# Patient Record
Sex: FEMALE | Race: WHITE | NOT HISPANIC OR LATINO | Employment: OTHER | URBAN - METROPOLITAN AREA
[De-identification: names, ages, dates, MRNs, and addresses within clinical notes are randomized per-mention and may not be internally consistent; named-entity substitution may affect disease eponyms.]

---

## 2024-05-21 ENCOUNTER — OFFICE VISIT (OUTPATIENT)
Dept: CARDIOLOGY | Facility: CLINIC | Age: 74
End: 2024-05-21
Payer: MEDICARE

## 2024-05-21 VITALS
BODY MASS INDEX: 37.65 KG/M2 | OXYGEN SATURATION: 96 % | SYSTOLIC BLOOD PRESSURE: 123 MMHG | WEIGHT: 240.4 LBS | DIASTOLIC BLOOD PRESSURE: 72 MMHG | HEART RATE: 57 BPM

## 2024-05-21 DIAGNOSIS — E78.5 HYPERLIPIDEMIA, UNSPECIFIED HYPERLIPIDEMIA TYPE: ICD-10-CM

## 2024-05-21 DIAGNOSIS — I48.0 PAROXYSMAL ATRIAL FIBRILLATION (MULTI): Primary | ICD-10-CM

## 2024-05-21 DIAGNOSIS — I10 HYPERTENSION, UNSPECIFIED TYPE: ICD-10-CM

## 2024-05-21 PROCEDURE — 3078F DIAST BP <80 MM HG: CPT | Performed by: NURSE PRACTITIONER

## 2024-05-21 PROCEDURE — 1159F MED LIST DOCD IN RCRD: CPT | Performed by: NURSE PRACTITIONER

## 2024-05-21 PROCEDURE — 1036F TOBACCO NON-USER: CPT | Performed by: NURSE PRACTITIONER

## 2024-05-21 PROCEDURE — 99214 OFFICE O/P EST MOD 30 MIN: CPT | Performed by: NURSE PRACTITIONER

## 2024-05-21 PROCEDURE — 3074F SYST BP LT 130 MM HG: CPT | Performed by: NURSE PRACTITIONER

## 2024-05-21 RX ORDER — DM/PE/ACETAMINOPHEN/CHLORPHENR 10-5-325-2
1 TABLET, SEQUENTIAL ORAL DAILY
COMMUNITY
Start: 2022-07-08

## 2024-05-21 RX ORDER — ACYCLOVIR 400 MG/1
1 TABLET ORAL DAILY
COMMUNITY
Start: 2021-09-13

## 2024-05-21 RX ORDER — EZETIMIBE 10 MG/1
10 TABLET ORAL DAILY
COMMUNITY

## 2024-05-21 RX ORDER — METOPROLOL SUCCINATE 50 MG/1
50 TABLET, EXTENDED RELEASE ORAL DAILY
Qty: 30 TABLET | Refills: 11 | Status: SHIPPED | OUTPATIENT
Start: 2024-05-21 | End: 2025-05-21

## 2024-05-21 RX ORDER — ESOMEPRAZOLE MAGNESIUM 40 MG/1
40 CAPSULE, DELAYED RELEASE ORAL
COMMUNITY

## 2024-05-21 RX ORDER — EPINEPHRINE 0.22MG
1 AEROSOL WITH ADAPTER (ML) INHALATION DAILY
COMMUNITY
Start: 2020-05-28

## 2024-05-21 RX ORDER — DILTIAZEM HYDROCHLORIDE 120 MG/1
1 CAPSULE, EXTENDED RELEASE ORAL DAILY
COMMUNITY
Start: 2022-07-08 | End: 2024-05-21

## 2024-05-21 RX ORDER — FENOFIBRATE 160 MG/1
160 TABLET ORAL DAILY
COMMUNITY
Start: 2024-05-14

## 2024-05-21 ASSESSMENT — ENCOUNTER SYMPTOMS
HEMATOLOGIC/LYMPHATIC NEGATIVE: 1
RESPIRATORY NEGATIVE: 1
NEUROLOGICAL NEGATIVE: 1
MYALGIAS: 1
ENDOCRINE NEGATIVE: 1
EYES NEGATIVE: 1
PSYCHIATRIC NEGATIVE: 1
DEPRESSION: 0
GASTROINTESTINAL NEGATIVE: 1
PALPITATIONS: 1

## 2024-05-21 NOTE — PATIENT INSTRUCTIONS
CALL WITH ANY QUESTIONS   STOP THE DILTIAZEM   START METOPROLOL ER 50 MG DAILY   FOLLOW UP WITH ELECTROPHYSIOLOGY

## 2024-05-21 NOTE — PROGRESS NOTES
Referred by Dr. Ron ref. provider found for Follow-up (RENITAO result.  PCP referral.)     History Of Present Illness:    Lizzie Cadet is a very pleasant 74 year old female with a history of heart palpitations  and HLD, she is here for a follow up visit. The patient is seen in collaboration with Dr. Hollis. Mrs. Cadet complains of heart palpitations, describes it as a fluttering occurs more at night. Lasts 15 minutes to a few hours at a time. Started in February. Denies chest pain or shortness of breath.     Review of Systems   Constitutional: Positive for malaise/fatigue.   HENT: Negative.     Eyes: Negative.    Cardiovascular:  Positive for palpitations.   Respiratory: Negative.     Endocrine: Negative.    Hematologic/Lymphatic: Negative.    Skin: Negative.    Musculoskeletal:  Positive for myalgias.   Gastrointestinal: Negative.    Neurological: Negative.    Psychiatric/Behavioral: Negative.        Past Medical History:  She has a past medical history of Encounter for immunization (09/11/2020), Personal history of other drug therapy (10/04/2018), Personal history of other specified conditions (05/28/2020), Personal history of other specified conditions (10/04/2018), Personal history of pneumonia (recurrent) (01/10/2019), Polyp of colon (03/12/2021), and Right upper quadrant pain (02/08/2019).    Past Surgical History:  She has a past surgical history that includes Tonsillectomy (10/04/2018); Cholecystectomy (10/04/2018); Breast biopsy (10/04/2018); Hysterectomy (10/04/2018); Total knee arthroplasty (10/04/2018); Other surgical history (10/04/2018); Other surgical history (10/04/2018); and Other surgical history (10/04/2018).      Social History:  She reports that she has never smoked. She has never used smokeless tobacco. She reports current alcohol use. She reports that she does not use drugs.    Family History:  No family history on file.     Allergies:  Levofloxacin, Aspirin, Clarithromycin, and  Niacin    Outpatient Medications:  Current Outpatient Medications   Medication Instructions    acyclovir (Zovirax) 400 mg tablet 1 tablet, oral, Daily    calcium carb/vit D3/minerals (CALCIUM-VITAMIN D ORAL) 1 tablet, oral, 2 times daily    coenzyme Q-10 100 mg capsule 1 capsule, oral, Daily    dilTIAZem ER (Tiazac) 120 mg 24 hr capsule 1 capsule, oral, Daily    esomeprazole (NEXIUM) 40 mg, oral, Daily before breakfast    ezetimibe (ZETIA) 10 mg, oral, Daily    fenofibrate (TRIGLIDE) 160 mg, oral, Daily    glucosamine sulfate 2KCl 1,000 mg tablet 1 tablet, oral, Daily    vit C/E/Zn/coppr/lutein/zeaxan (PRESERVISION AREDS-2 ORAL) 1 tablet, oral, 2 times daily        Last Recorded Vitals:  Vitals:    05/21/24 0942   BP: 123/72   Pulse: 57   SpO2: 96%   Weight: 109 kg (240 lb 6.4 oz)       Physical Exam:  Physical Exam  Vitals reviewed.   HENT:      Head: Normocephalic.      Nose: Nose normal.   Eyes:      Pupils: Pupils are equal, round, and reactive to light.   Cardiovascular:      Rate and Rhythm: Normal rate and regular rhythm.   Pulmonary:      Effort: Pulmonary effort is normal.      Breath sounds: Normal breath sounds.   Abdominal:      General: Abdomen is flat.      Palpations: Abdomen is soft.   Musculoskeletal:         General: Normal range of motion.      Cervical back: Normal range of motion.   Skin:     General: Skin is warm and dry.   Neurological:      General: No focal deficit present.      Mental Status: He is alert and oriented to person, place, and time.   Psychiatric:         Mood and Affect: Mood normal.            Last Labs:  CBC -  Lab Results   Component Value Date    WBC 5.5 03/08/2021    HGB 14.1 03/08/2021    HCT 44.7 03/08/2021     03/08/2021     03/08/2021       CMP -  Lab Results   Component Value Date    CALCIUM 10.4 09/17/2021    PROT 6.2 (L) 03/08/2021    ALBUMIN 4.0 03/08/2021    AST 35 03/08/2021    ALT 38 03/08/2021    ALKPHOS 48 03/08/2021    BILITOT 0.4 03/08/2021        LIPID PANEL -   Lab Results   Component Value Date    CHOL 217 (H) 09/17/2021    TRIG 256 (H) 09/17/2021    HDL 47.8 09/17/2021    CHHDL 4.5 09/17/2021    LDLF 118 (H) 09/17/2021    VLDL 51 (H) 09/17/2021    NHDL 169 09/17/2021       RENAL FUNCTION PANEL -   Lab Results   Component Value Date    GLUCOSE 113 (H) 09/17/2021     09/17/2021    K 4.8 09/17/2021     09/17/2021    CO2 30 09/17/2021    ANIONGAP 11 09/17/2021    BUN 14 09/17/2021    CREATININE 0.80 09/17/2021    CALCIUM 10.4 09/17/2021    ALBUMIN 4.0 03/08/2021        Lab Results   Component Value Date    HGBA1C 6.0 (H) 04/19/2024       Last Cardiology Tests:  ECG:    Echo:  ECHO: 4/30/2021  1. The left ventricular systolic function is normal with a 60-65% estimated  ejection fraction.  2. There is mild mitral and tricuspid regurgitation.  Ejection Fractions:  LVEF 60-65%  Cath:    Stress Test:  Cardiac Stress Test: 5/11/2021  Summary:  1. No electrocardiographic evidence for ischemia at a maximal infusion.  2. Nuclear image results are reported separately.  3. The adequate level of stress was achieved.     Cardiac Imaging:   MEDICAL ECG Monitor: 3/29/2021 (14 days duration)     Predominant rhythm was sinus rhythm.  Minimum HR: 35 bpm  Maximum HR: 94 bpm  Average HR: 67 bpm  2 pauses lasting 3.69 - 4.30 seconds noted  Supraventricular ectopics noted 43,818 times including 841 couplets and 60 runs lasting 3-14 beats with rates to 157 bpm. and bpm  Ventricular ectopics noted 19,954 times including couplets, bigeminy and trigeminy.       Assessment/Plan   Very pleasant 74 year old female with a history of palpitations, dyslipidemia and GERD, she complains of increased heart palpitations in the evening. Recent heart monitor with her primary care shows episodes of atrial fibrillation. CHADS VASC score of 2, she will be started on Eliquis 5 mg twice a day. The Cardizem will be stopped and start Metoprolol ER 50 mg daily. Heart rate and blood  pressure well controlled today.      Plan:   Call with any questions   Stop Cardizem   Continue ezetimibe and fenofibrate  Follow up with electrophysiology  Start Metoprolol ER 50 mg daily   Start Eliquis 5 mg twice a day       Lakesha Marcelino, APRN-CNP

## 2024-06-03 ENCOUNTER — HOSPITAL ENCOUNTER (OUTPATIENT)
Dept: RADIOLOGY | Facility: HOSPITAL | Age: 74
Discharge: HOME | End: 2024-06-03
Payer: MEDICARE

## 2024-06-03 VITALS — HEIGHT: 67 IN | WEIGHT: 240 LBS | BODY MASS INDEX: 37.67 KG/M2

## 2024-06-03 DIAGNOSIS — Z12.31 ENCOUNTER FOR SCREENING MAMMOGRAM FOR MALIGNANT NEOPLASM OF BREAST: ICD-10-CM

## 2024-06-03 PROCEDURE — 77067 SCR MAMMO BI INCL CAD: CPT

## 2024-06-03 PROCEDURE — 77067 SCR MAMMO BI INCL CAD: CPT | Performed by: RADIOLOGY

## 2024-06-03 PROCEDURE — 77063 BREAST TOMOSYNTHESIS BI: CPT | Performed by: RADIOLOGY

## 2024-06-11 PROBLEM — I48.0 AF (PAROXYSMAL ATRIAL FIBRILLATION) (MULTI): Status: ACTIVE | Noted: 2024-06-11

## 2024-06-11 PROBLEM — D18.01 HEMANGIOMA OF SKIN AND SUBCUTANEOUS TISSUE: Status: ACTIVE | Noted: 2022-04-26

## 2024-06-11 PROBLEM — G47.33 OBSTRUCTIVE SLEEP APNEA SYNDROME: Status: ACTIVE | Noted: 2023-05-17

## 2024-06-11 PROBLEM — R07.9 CHEST PAIN: Status: ACTIVE | Noted: 2024-06-11

## 2024-06-11 PROBLEM — L21.8 OTHER SEBORRHEIC DERMATITIS: Status: ACTIVE | Noted: 2020-12-29

## 2024-06-11 PROBLEM — M51.369 DEGENERATION OF INTERVERTEBRAL DISC OF LUMBAR REGION: Status: ACTIVE | Noted: 2023-05-17

## 2024-06-11 PROBLEM — H02.88A MEIBOMIAN GLAND DYSFUNCTION (MGD) OF UPPER AND LOWER EYELID OF RIGHT EYE: Status: ACTIVE | Noted: 2024-06-11

## 2024-06-11 PROBLEM — I49.9 VENTRICULAR ARRHYTHMIA: Status: ACTIVE | Noted: 2024-06-11

## 2024-06-11 PROBLEM — R00.2 PALPITATIONS: Status: ACTIVE | Noted: 2022-07-08

## 2024-06-11 PROBLEM — N63.20 LEFT BREAST MASS: Status: ACTIVE | Noted: 2024-06-11

## 2024-06-11 PROBLEM — M81.0 OSTEOPOROSIS: Status: ACTIVE | Noted: 2024-06-11

## 2024-06-11 PROBLEM — L85.3 XEROSIS CUTIS: Status: ACTIVE | Noted: 2020-12-29

## 2024-06-11 PROBLEM — K63.5 POLYP OF COLON: Status: ACTIVE | Noted: 2023-05-17

## 2024-06-11 PROBLEM — K21.9 GERD (GASTROESOPHAGEAL REFLUX DISEASE): Status: ACTIVE | Noted: 2022-04-29

## 2024-06-11 PROBLEM — E78.2 MIXED HYPERLIPIDEMIA: Status: ACTIVE | Noted: 2022-04-29

## 2024-06-11 PROBLEM — H52.4 REGULAR ASTIGMATISM OF BOTH EYES WITH PRESBYOPIA: Status: ACTIVE | Noted: 2024-06-11

## 2024-06-11 PROBLEM — I49.1 PAC (PREMATURE ATRIAL CONTRACTION): Status: ACTIVE | Noted: 2024-06-11

## 2024-06-11 PROBLEM — M19.90 OSTEOARTHRITIS: Status: ACTIVE | Noted: 2023-05-17

## 2024-06-11 PROBLEM — M85.80 OSTEOPENIA: Status: ACTIVE | Noted: 2023-05-17

## 2024-06-11 PROBLEM — G89.29 CHRONIC BACK PAIN: Status: ACTIVE | Noted: 2023-05-17

## 2024-06-11 PROBLEM — E78.5 DYSLIPIDEMIA: Status: ACTIVE | Noted: 2024-06-11

## 2024-06-11 PROBLEM — M54.9 CHRONIC BACK PAIN: Status: ACTIVE | Noted: 2023-05-17

## 2024-06-11 PROBLEM — H52.10 MYOPIA: Status: ACTIVE | Noted: 2023-05-17

## 2024-06-11 PROBLEM — E66.9 OBESITY WITH BODY MASS INDEX 30 OR GREATER: Status: ACTIVE | Noted: 2022-04-29

## 2024-06-11 PROBLEM — R00.1 BRADYCARDIA: Status: ACTIVE | Noted: 2024-06-11

## 2024-06-11 PROBLEM — H52.223 REGULAR ASTIGMATISM OF BOTH EYES WITH PRESBYOPIA: Status: ACTIVE | Noted: 2024-06-11

## 2024-06-11 PROBLEM — M51.36 DEGENERATION OF INTERVERTEBRAL DISC OF LUMBAR REGION: Status: ACTIVE | Noted: 2023-05-17

## 2024-06-11 PROBLEM — H43.811 PVD (POSTERIOR VITREOUS DETACHMENT), RIGHT EYE: Status: ACTIVE | Noted: 2020-10-22

## 2024-06-11 ASSESSMENT — CHA2DS2 SCORE
VASCULAR DISEASE: NO
CHA2D2S VASC SCORE: 2
AGE IN YEARS: 65-74
PRIOR STROKE OR TIA OR THROMBOEMBOLISM: NO
DIABETES: NO
CHF OR LEFT VENTRICULAR DYSFUNCTION: NO
HYPERTENSION: NO
SEX: FEMALE

## 2024-06-11 NOTE — PROGRESS NOTES
"I had the pleasure seeing Lizzie Cadet     Chief Complaint   Patient presents with    Palpitations    Atrial Fibrillation     OUTPATIENT CONSULTATION: Cardiac Electrophysiology  DOS: June 12, 2024  REASON:  pAF  REFERRING: Lakesha Bobo CNP / Aníbal Hollis MD      [id number] Score: 2   (Age,sex)    Current Outpatient Medications   Medication Instructions    acyclovir (Zovirax) 400 mg tablet 1 tablet, oral, Daily    apixaban (ELIQUIS) 5 mg, oral, 2 times daily    calcium carb/vit D3/minerals (CALCIUM-VITAMIN D ORAL) 1 tablet, oral, 2 times daily    coenzyme Q-10 100 mg capsule 1 capsule, oral, Daily    esomeprazole (NEXIUM) 40 mg, oral, Daily before breakfast    ezetimibe (ZETIA) 10 mg, oral, Daily    fenofibrate (TRIGLIDE) 160 mg, oral, Daily    glucosamine sulfate 2KCl 1,000 mg tablet 1 tablet, oral, Daily    metoprolol succinate XL (TOPROL-XL) 50 mg, oral, Daily, Do not crush or chew.    vit C/E/Zn/coppr/lutein/zeaxan (PRESERVISION AREDS-2 ORAL) 1 tablet, oral, 2 times daily        Lizzie Cadet is a 74 y.o. with:     HL, SALO, Obesity, GERD, chronic back pain  Palpitations/   SVT  Paroxysmal AF - (index dx Apr 2024) noted on Monitor.  Tx: Metoprolol and Eliquis    AF symptoms include palpitations and fluttering feeling in chest.        TESTING    -ECHO/ TTE:  (4/30/21) LVEF 60-65%.  No sig valve disease.       -Monitor: ZIO 14 days (April 2024) showed AF ([place] 6%) longest run lasting 2hrs and 16 min, 4 runs SVT longest lasting 7 beats. HR range 169-39 bpm with Avg HR 69 bpm.  Rare SVEs ([place] <1%) and VEs ([place] 1.5%).     -NM Ex Stress:  (5/11/21) No evidence of ischemia on ECG tracings or imaging.            Objective   Physical Exam  /82 (BP Location: Left arm, Patient Position: Sitting, BP Cuff Size: Large adult)   Pulse 84   Resp 18   Ht 1.702 m (5' 7\")   Wt 110 kg (242 lb)   SpO2 95%   BMI 37.90 kg/m²     General Appearance:  Alert, cooperative, no distress, appears stated age "   Head:  Normocephalic, without obvious abnormality, atraumatic   Eyes:  PERRL, conjunctiva/corneas clear, EOM's intact, fundi benign, both eyes   Ears:  Normal TM's and external ear canals, both ears   Nose: Nares normal, septum midline,mucosa normal, no drainage or sinus tenderness   Throat: Lips, mucosa, and tongue normal; teeth and gums normal   Neck: Supple, symmetrical, trachea midline, no adenopathy;  thyroid: not enlarged, symmetric, no tenderness/mass/nodules; no carotid bruit or JVD   Back:   Symmetric, no curvature, ROM normal, no CVA tenderness   Lungs:   Clear to auscultation bilaterally, respirations unlabored   Breasts:  No masses or tenderness   Heart:  Regular rate and rhythm, S1 and S2 normal, no murmur, rub, or gallop   Abdomen:   Soft, non-tender, bowel sounds active all four quadrants,  no masses, no organomegaly   Pelvic: Deferred   Extremities: Extremities normal, atraumatic, no cyanosis or edema   Pulses: 2+ and symmetric   Skin: Skin color, texture, turgor normal, no rashes or lesions   Lymph nodes: Cervical, supraclavicular, and axillary nodes normal   Neurologic: Normal           Assessment/Plan   Paroxysmal Atrial Fibrillation - She is fairly symptomatic and also fatigued. She is also very active. We discussed the options. We discussed 1. Observation 2. Medical Therapy and 3. PVI. She is very much against taking medications. She is also planning on moving and would prefer to have the PVI. I did explain the procedure, I explained the risks. We are proceeding with PVI on August 16th, 2024

## 2024-06-12 ENCOUNTER — OFFICE VISIT (OUTPATIENT)
Dept: CARDIOLOGY | Facility: CLINIC | Age: 74
End: 2024-06-12
Payer: MEDICARE

## 2024-06-12 VITALS
HEIGHT: 67 IN | DIASTOLIC BLOOD PRESSURE: 82 MMHG | OXYGEN SATURATION: 95 % | WEIGHT: 242 LBS | BODY MASS INDEX: 37.98 KG/M2 | SYSTOLIC BLOOD PRESSURE: 133 MMHG | RESPIRATION RATE: 18 BRPM | HEART RATE: 84 BPM

## 2024-06-12 DIAGNOSIS — I48.0 PAROXYSMAL ATRIAL FIBRILLATION (MULTI): ICD-10-CM

## 2024-06-12 DIAGNOSIS — R00.2 PALPITATIONS: ICD-10-CM

## 2024-06-12 DIAGNOSIS — R00.1 BRADYCARDIA: ICD-10-CM

## 2024-06-12 DIAGNOSIS — R07.9 CHEST PAIN, UNSPECIFIED TYPE: ICD-10-CM

## 2024-06-12 DIAGNOSIS — Z01.818 PREOP TESTING: ICD-10-CM

## 2024-06-12 PROCEDURE — 1159F MED LIST DOCD IN RCRD: CPT | Performed by: INTERNAL MEDICINE

## 2024-06-12 PROCEDURE — 1036F TOBACCO NON-USER: CPT | Performed by: INTERNAL MEDICINE

## 2024-06-12 PROCEDURE — 93010 ELECTROCARDIOGRAM REPORT: CPT | Performed by: INTERNAL MEDICINE

## 2024-06-12 PROCEDURE — 93005 ELECTROCARDIOGRAM TRACING: CPT | Performed by: INTERNAL MEDICINE

## 2024-06-12 PROCEDURE — 99205 OFFICE O/P NEW HI 60 MIN: CPT | Performed by: INTERNAL MEDICINE

## 2024-06-12 PROCEDURE — 99215 OFFICE O/P EST HI 40 MIN: CPT | Mod: 25 | Performed by: INTERNAL MEDICINE

## 2024-06-12 ASSESSMENT — PATIENT HEALTH QUESTIONNAIRE - PHQ9
2. FEELING DOWN, DEPRESSED OR HOPELESS: NOT AT ALL
1. LITTLE INTEREST OR PLEASURE IN DOING THINGS: NOT AT ALL
SUM OF ALL RESPONSES TO PHQ9 QUESTIONS 1 AND 2: 0

## 2024-06-12 ASSESSMENT — ENCOUNTER SYMPTOMS: DEPRESSION: 0

## 2024-06-13 LAB
ATRIAL RATE: 375 BPM
Q ONSET: 229 MS
QRS COUNT: 15 BEATS
QRS DURATION: 78 MS
QT INTERVAL: 336 MS
QTC CALCULATION(BAZETT): 415 MS
QTC FREDERICIA: 387 MS
R AXIS: -37 DEGREES
T AXIS: 12 DEGREES
T OFFSET: 397 MS
VENTRICULAR RATE: 92 BPM

## 2024-06-27 ENCOUNTER — HOSPITAL ENCOUNTER (OUTPATIENT)
Dept: CARDIOLOGY | Facility: CLINIC | Age: 74
Discharge: HOME | End: 2024-06-27
Payer: MEDICARE

## 2024-06-27 DIAGNOSIS — R07.9 CHEST PAIN, UNSPECIFIED TYPE: ICD-10-CM

## 2024-06-27 DIAGNOSIS — R00.1 BRADYCARDIA: ICD-10-CM

## 2024-06-27 DIAGNOSIS — R00.2 PALPITATIONS: ICD-10-CM

## 2024-06-27 PROCEDURE — 93306 TTE W/DOPPLER COMPLETE: CPT

## 2024-06-27 PROCEDURE — 93306 TTE W/DOPPLER COMPLETE: CPT | Performed by: INTERNAL MEDICINE

## 2024-06-29 LAB
AORTIC VALVE PEAK VELOCITY: 1.3 M/S
AV PEAK GRADIENT: 6.8 MMHG
AVA (PEAK VEL): 2.26 CM2
EJECTION FRACTION APICAL 4 CHAMBER: 57.8
EJECTION FRACTION: 63 %
LEFT ATRIUM VOLUME AREA LENGTH INDEX BSA: 33.6 ML/M2
LEFT VENTRICLE INTERNAL DIMENSION DIASTOLE: 4.86 CM (ref 3.5–6)
LEFT VENTRICULAR OUTFLOW TRACT DIAMETER: 2.03 CM
RIGHT VENTRICLE FREE WALL PEAK S': 9.83 CM/S
RIGHT VENTRICLE PEAK SYSTOLIC PRESSURE: 33.2 MMHG
TRICUSPID ANNULAR PLANE SYSTOLIC EXCURSION: 1.7 CM

## 2024-07-23 ENCOUNTER — OFFICE VISIT (OUTPATIENT)
Dept: CARDIOLOGY | Facility: CLINIC | Age: 74
End: 2024-07-23
Payer: MEDICARE

## 2024-07-23 VITALS
BODY MASS INDEX: 37.98 KG/M2 | OXYGEN SATURATION: 95 % | SYSTOLIC BLOOD PRESSURE: 130 MMHG | WEIGHT: 242 LBS | HEART RATE: 90 BPM | HEIGHT: 67 IN | DIASTOLIC BLOOD PRESSURE: 81 MMHG

## 2024-07-23 DIAGNOSIS — I48.0 PAROXYSMAL ATRIAL FIBRILLATION (MULTI): ICD-10-CM

## 2024-07-23 DIAGNOSIS — E78.5 HYPERLIPIDEMIA, UNSPECIFIED HYPERLIPIDEMIA TYPE: Primary | ICD-10-CM

## 2024-07-23 PROCEDURE — G2211 COMPLEX E/M VISIT ADD ON: HCPCS | Performed by: NURSE PRACTITIONER

## 2024-07-23 PROCEDURE — 1159F MED LIST DOCD IN RCRD: CPT | Performed by: NURSE PRACTITIONER

## 2024-07-23 PROCEDURE — 99213 OFFICE O/P EST LOW 20 MIN: CPT | Performed by: NURSE PRACTITIONER

## 2024-07-23 PROCEDURE — 3008F BODY MASS INDEX DOCD: CPT | Performed by: NURSE PRACTITIONER

## 2024-07-23 PROCEDURE — 1036F TOBACCO NON-USER: CPT | Performed by: NURSE PRACTITIONER

## 2024-07-23 ASSESSMENT — ENCOUNTER SYMPTOMS
DEPRESSION: 0
ENDOCRINE NEGATIVE: 1
EYES NEGATIVE: 1
RESPIRATORY NEGATIVE: 1
PALPITATIONS: 1
PSYCHIATRIC NEGATIVE: 1
HEMATOLOGIC/LYMPHATIC NEGATIVE: 1
NEUROLOGICAL NEGATIVE: 1
GASTROINTESTINAL NEGATIVE: 1
MYALGIAS: 1

## 2024-07-23 ASSESSMENT — PATIENT HEALTH QUESTIONNAIRE - PHQ9
SUM OF ALL RESPONSES TO PHQ9 QUESTIONS 1 AND 2: 0
2. FEELING DOWN, DEPRESSED OR HOPELESS: NOT AT ALL
1. LITTLE INTEREST OR PLEASURE IN DOING THINGS: NOT AT ALL

## 2024-07-23 NOTE — PROGRESS NOTES
Referred by Dr. Ariadne reed. provider found for Follow-up (2 Month FUV)     History Of Present Illness:    Lizzie Cadet is a very pleasant 74 year old female with a history of heart palpitations  and HLD, she is here for a follow up visit. The patient is seen in collaboration with Dr. Hollis. Mrs. Cadet is scheduled for a PVI on 8/16/2024. Continues to have palpitations all the time. Difficulty relaxing. Denies chest pain,increased shortness of breath.     Review of Systems   Constitutional: Positive for malaise/fatigue.   HENT: Negative.     Eyes: Negative.    Cardiovascular:  Positive for palpitations.   Respiratory: Negative.     Endocrine: Negative.    Hematologic/Lymphatic: Negative.    Skin: Negative.    Musculoskeletal:  Positive for myalgias.   Gastrointestinal: Negative.    Neurological: Negative.    Psychiatric/Behavioral: Negative.        Past Medical History:  She has a past medical history of Encounter for immunization (09/11/2020), Personal history of other drug therapy (10/04/2018), Personal history of other specified conditions (05/28/2020), Personal history of other specified conditions (10/04/2018), Personal history of pneumonia (recurrent) (01/10/2019), Polyp of colon (03/12/2021), and Right upper quadrant pain (02/08/2019).    Past Surgical History:  She has a past surgical history that includes Tonsillectomy (10/04/2018); Cholecystectomy (10/04/2018); Breast biopsy (10/04/2018); Hysterectomy (10/04/2018); Total knee arthroplasty (10/04/2018); Other surgical history (10/04/2018); Other surgical history (10/04/2018); Other surgical history (10/04/2018); and Breast biopsy (Left).      Social History:  She reports that she has never smoked. She has never used smokeless tobacco. She reports current alcohol use. She reports that she does not use drugs.    Family History:  Family History   Problem Relation Name Age of Onset    Breast cancer Mother  60 - 69    Breast cancer Maternal Grandmother  60 - 69     Breast cancer Mother's Sister  40        Allergies:  Levofloxacin, Aspirin, Clarithromycin, and Niacin    Outpatient Medications:  Current Outpatient Medications   Medication Instructions    acyclovir (Zovirax) 400 mg tablet 1 tablet, oral, As needed    apixaban (ELIQUIS) 5 mg, oral, 2 times daily    calcium carb/vit D3/minerals (CALCIUM-VITAMIN D ORAL) 1 tablet, oral, 2 times daily    coenzyme Q-10 100 mg capsule 1 capsule, oral, Daily    esomeprazole (NEXIUM) 40 mg, oral, Daily before breakfast    ezetimibe (ZETIA) 10 mg, oral, Daily    fenofibrate (TRIGLIDE) 160 mg, oral, Daily    glucosamine sulfate 2KCl 1,000 mg tablet 1 tablet, oral, Daily    metoprolol succinate XL (TOPROL-XL) 50 mg, oral, Daily, Do not crush or chew.    vit C/E/Zn/coppr/lutein/zeaxan (PRESERVISION AREDS-2 ORAL) 1 tablet, oral, 2 times daily        Last Recorded Vitals:  There were no vitals filed for this visit.      Physical Exam:  Physical Exam  Vitals reviewed.   HENT:      Head: Normocephalic.      Nose: Nose normal.   Eyes:      Pupils: Pupils are equal, round, and reactive to light.   Cardiovascular:      Rate and Rhythm: Normal rate and regular rhythm.   Pulmonary:      Effort: Pulmonary effort is normal.      Breath sounds: Normal breath sounds.   Abdominal:      General: Abdomen is flat.      Palpations: Abdomen is soft.   Musculoskeletal:         General: Normal range of motion.      Cervical back: Normal range of motion.   Skin:     General: Skin is warm and dry.   Neurological:      General: No focal deficit present.      Mental Status: He is alert and oriented to person, place, and time.   Psychiatric:         Mood and Affect: Mood normal.            Last Labs:  CBC -  Lab Results   Component Value Date    WBC 5.5 03/08/2021    HGB 14.1 03/08/2021    HCT 44.7 03/08/2021     03/08/2021     03/08/2021       CMP -  Lab Results   Component Value Date    CALCIUM 10.4 09/17/2021    PROT 6.2 (L) 03/08/2021    ALBUMIN  4.0 03/08/2021    AST 35 03/08/2021    ALT 38 03/08/2021    ALKPHOS 48 03/08/2021    BILITOT 0.4 03/08/2021       LIPID PANEL -   Lab Results   Component Value Date    CHOL 217 (H) 09/17/2021    TRIG 256 (H) 09/17/2021    HDL 47.8 09/17/2021    CHHDL 4.5 09/17/2021    LDLF 118 (H) 09/17/2021    VLDL 51 (H) 09/17/2021    NHDL 169 09/17/2021       RENAL FUNCTION PANEL -   Lab Results   Component Value Date    GLUCOSE 113 (H) 09/17/2021     09/17/2021    K 4.8 09/17/2021     09/17/2021    CO2 30 09/17/2021    ANIONGAP 11 09/17/2021    BUN 14 09/17/2021    CREATININE 0.80 09/17/2021    CALCIUM 10.4 09/17/2021    ALBUMIN 4.0 03/08/2021        Lab Results   Component Value Date    HGBA1C 6.0 (H) 04/19/2024       Last Cardiology Tests:  ECG:    Echo:  ECHO: 4/30/2021  1. The left ventricular systolic function is normal with a 60-65% estimated  ejection fraction.  2. There is mild mitral and tricuspid regurgitation.  Ejection Fractions:  LVEF 60-65%  Cath:    Stress Test:  Cardiac Stress Test: 5/11/2021  Summary:  1. No electrocardiographic evidence for ischemia at a maximal infusion.  2. Nuclear image results are reported separately.  3. The adequate level of stress was achieved.     Cardiac Imaging:   MEDICAL ECG Monitor: 3/29/2021 (14 days duration)     Predominant rhythm was sinus rhythm.  Minimum HR: 35 bpm  Maximum HR: 94 bpm  Average HR: 67 bpm  2 pauses lasting 3.69 - 4.30 seconds noted  Supraventricular ectopics noted 43,818 times including 841 couplets and 60 runs lasting 3-14 beats with rates to 157 bpm. and bpm  Ventricular ectopics noted 19,954 times including couplets, bigeminy and trigeminy.       Assessment/Plan   Very pleasant 74 year old female with a history of palpitations, dyslipidemia and GERD, she is scheduled for PVI 8/16/2024, continues to have heart palpitations. She has been tolerating her medication. Heart rate and blood pressure are well controlled today.      Plan:   Call with any  questions   Continue ezetimibe and fenofibrate  Follow up with electrophysiology  Continue  Metoprolol ER 50 mg daily   Continue Eliquis 5 mg twice a day       Lakesha Marcelino, APRN-CNP

## 2024-07-30 ENCOUNTER — APPOINTMENT (OUTPATIENT)
Dept: DERMATOLOGY | Facility: CLINIC | Age: 74
End: 2024-07-30
Payer: MEDICARE

## 2024-07-30 DIAGNOSIS — D22.9 MULTIPLE BENIGN NEVI: Primary | ICD-10-CM

## 2024-07-30 DIAGNOSIS — L57.8 ACTINIC SKIN DAMAGE: ICD-10-CM

## 2024-07-30 DIAGNOSIS — L82.1 SEBORRHEIC KERATOSIS: ICD-10-CM

## 2024-07-30 DIAGNOSIS — L81.4 LENTIGO: ICD-10-CM

## 2024-07-30 DIAGNOSIS — Z12.83 SCREENING EXAM FOR SKIN CANCER: ICD-10-CM

## 2024-07-30 DIAGNOSIS — L21.9 SEBORRHEIC DERMATITIS: ICD-10-CM

## 2024-07-30 PROCEDURE — 1159F MED LIST DOCD IN RCRD: CPT | Performed by: DERMATOLOGY

## 2024-07-30 PROCEDURE — 1036F TOBACCO NON-USER: CPT | Performed by: DERMATOLOGY

## 2024-07-30 PROCEDURE — 99213 OFFICE O/P EST LOW 20 MIN: CPT | Performed by: DERMATOLOGY

## 2024-07-30 RX ORDER — KETOCONAZOLE 20 MG/G
CREAM TOPICAL 2 TIMES DAILY
Qty: 60 G | Refills: 11 | Status: SHIPPED | OUTPATIENT
Start: 2024-07-30

## 2024-07-30 ASSESSMENT — DERMATOLOGY PATIENT ASSESSMENT
DO YOU HAVE IRREGULAR MENSTRUAL CYCLES: NO
DO YOU USE SUNSCREEN: OCCASIONALLY
HAVE YOU HAD OR DO YOU HAVE VASCULAR DISEASE: NO
DO YOU USE A TANNING BED: NO
ARE YOU AN ORGAN TRANSPLANT RECIPIENT: NO
ARE YOU ON BIRTH CONTROL: NO
ARE YOU TRYING TO GET PREGNANT: NO
DO YOU HAVE ANY NEW OR CHANGING LESIONS: NO
HAVE YOU HAD OR DO YOU HAVE A STAPH INFECTION: NO

## 2024-07-30 ASSESSMENT — DERMATOLOGY QUALITY OF LIFE (QOL) ASSESSMENT
ARE THERE EXCLUSIONS OR EXCEPTIONS FOR THE QUALITY OF LIFE ASSESSMENT: NO
RATE HOW BOTHERED YOU ARE BY SYMPTOMS OF YOUR SKIN PROBLEM (EG, ITCHING, STINGING BURNING, HURTING OR SKIN IRRITATION): 0 - NEVER BOTHERED
DATE THE QUALITY-OF-LIFE ASSESSMENT WAS COMPLETED: 67051
RATE HOW EMOTIONALLY BOTHERED YOU ARE BY YOUR SKIN PROBLEM (FOR EXAMPLE, WORRY, EMBARRASSMENT, FRUSTRATION): 0 - NEVER BOTHERED
RATE HOW BOTHERED YOU ARE BY EFFECTS OF YOUR SKIN PROBLEMS ON YOUR ACTIVITIES (EG, GOING OUT, ACCOMPLISHING WHAT YOU WANT, WORK ACTIVITIES OR YOUR RELATIONSHIPS WITH OTHERS): 0 - NEVER BOTHERED

## 2024-07-30 ASSESSMENT — ITCH NUMERIC RATING SCALE: HOW SEVERE IS YOUR ITCHING?: 0

## 2024-07-30 ASSESSMENT — PATIENT GLOBAL ASSESSMENT (PGA): PATIENT GLOBAL ASSESSMENT: PATIENT GLOBAL ASSESSMENT:  1 - CLEAR

## 2024-07-30 NOTE — PROGRESS NOTES
Subjective     Lizzie Cadet is a 74 y.o. female who presents for the following: Skin Check.     Last derm visit 8/1/23 for Full Skin Exam - no lesions of concern    Seb derm or potentially perioral derm, Rx ketoconazole 2% cream    Review of Systems:  No other skin or systemic complaints other than what is documented elsewhere in the note.    The following portions of the chart were reviewed this encounter and updated as appropriate:         Skin Cancer History  No skin cancer on file.      Specialty Problems          Dermatology Problems    Other seborrheic dermatitis    Xerosis cutis    Hemangioma of skin and subcutaneous tissue        Objective   Well appearing patient in no apparent distress; mood and affect are within normal limits.    A full examination was performed including scalp, head, eyes, ears, nose, lips, neck, chest, axillae, abdomen, back, buttocks, bilateral upper extremities, bilateral lower extremities, hands, feet, fingers, toes, fingernails, and toenails. All findings within normal limits unless otherwise noted below.    Assessment/Plan   1. Multiple benign nevi  Brown and tan macules and papules with reassuring findings on dermoscopy    -These lesions have benign, reassuring patterns on dermoscopy  -Recommend continued self observation, and to contact the office if any changes in nevi are noticed    2. Lentigo  Tan macules    -Benign appearing on exam  -Reassurance, recommend observation    3. Seborrheic keratosis  Stuck on, waxy macule(s)/papule(s)/plaque(s) with comedo-like openings and milia like cysts    Larger plaque with pedunculated tag on left inguinal fold. Discussed removal options but she has upcoming cardiac ablation so we will defer any treatment in the inguinal region right now    -Discussed the nature of the diagnosis  -Reassurance, recommend continued observation    4. Actinic skin damage  Background of photodamage with hyper- and hypo-pigmented macules on the skin    5.  Screening exam for skin cancer  As part of a routine Full Skin Exam, a genital examination with the presence of a chaperone was offered. The patient declined the exam and declined the chaperone. Follows with gyne      Full body skin exam  -No lesions concerning for malignancy on the remainder the skin exam today   - The ugly duckling sign was discussed. Monitor for any skin lesions that are different in color, shape, or size than others on body  -Sun protection was discussed. Recommend SPF 30+, hats with brims, sun protective clothing, and avoiding sun exposure between 10 AM and 2 PM whenever possible  -Recommend regular skin exams or sooner if new or changing lesions       6. Seborrheic dermatitis  Scalp  Clear on exam today on scalp. Light pink erythema of nasolabial folds    Try OTC dandruff shampoo. If not improving can request fluocinonide solution for scalp    ketoconazole (NIZOral) 2 % cream - Scalp  Apply topically 2 times a day. To redness, flaking, irritation of face around nose, eyebrows, ears and skin folds for 2-4 weeks as needed        Follow up 1-2 years Full Skin Exam, she will follow with derm in [place] since she is moving

## 2024-08-02 ENCOUNTER — LAB (OUTPATIENT)
Dept: LAB | Facility: LAB | Age: 74
End: 2024-08-02
Payer: MEDICARE

## 2024-08-02 DIAGNOSIS — I48.0 PAROXYSMAL ATRIAL FIBRILLATION (MULTI): ICD-10-CM

## 2024-08-02 DIAGNOSIS — Z01.818 PREOP TESTING: ICD-10-CM

## 2024-08-02 LAB
ANION GAP SERPL CALC-SCNC: 14 MMOL/L (ref 10–20)
BUN SERPL-MCNC: 18 MG/DL (ref 6–23)
CALCIUM SERPL-MCNC: 11.1 MG/DL (ref 8.6–10.6)
CHLORIDE SERPL-SCNC: 104 MMOL/L (ref 98–107)
CO2 SERPL-SCNC: 28 MMOL/L (ref 21–32)
CREAT SERPL-MCNC: 0.83 MG/DL (ref 0.5–1.05)
EGFRCR SERPLBLD CKD-EPI 2021: 75 ML/MIN/1.73M*2
ERYTHROCYTE [DISTWIDTH] IN BLOOD BY AUTOMATED COUNT: 12.9 % (ref 11.5–14.5)
GLUCOSE SERPL-MCNC: 110 MG/DL (ref 74–99)
HCT VFR BLD AUTO: 46.2 % (ref 36–46)
HGB BLD-MCNC: 14.5 G/DL (ref 12–16)
MCH RBC QN AUTO: 30.5 PG (ref 26–34)
MCHC RBC AUTO-ENTMCNC: 31.4 G/DL (ref 32–36)
MCV RBC AUTO: 97 FL (ref 80–100)
NRBC BLD-RTO: 0 /100 WBCS (ref 0–0)
PLATELET # BLD AUTO: 345 X10*3/UL (ref 150–450)
POTASSIUM SERPL-SCNC: 4.8 MMOL/L (ref 3.5–5.3)
RBC # BLD AUTO: 4.76 X10*6/UL (ref 4–5.2)
SODIUM SERPL-SCNC: 141 MMOL/L (ref 136–145)
WBC # BLD AUTO: 6.1 X10*3/UL (ref 4.4–11.3)

## 2024-08-02 PROCEDURE — 80048 BASIC METABOLIC PNL TOTAL CA: CPT

## 2024-08-02 PROCEDURE — 85027 COMPLETE CBC AUTOMATED: CPT

## 2024-08-02 PROCEDURE — 36415 COLL VENOUS BLD VENIPUNCTURE: CPT

## 2024-08-16 ENCOUNTER — ANESTHESIA (OUTPATIENT)
Dept: CARDIOLOGY | Facility: HOSPITAL | Age: 74
End: 2024-08-16
Payer: MEDICARE

## 2024-08-16 ENCOUNTER — ANESTHESIA EVENT (OUTPATIENT)
Dept: CARDIOLOGY | Facility: HOSPITAL | Age: 74
End: 2024-08-16
Payer: MEDICARE

## 2024-08-16 ENCOUNTER — HOSPITAL ENCOUNTER (OUTPATIENT)
Facility: HOSPITAL | Age: 74
Setting detail: OUTPATIENT SURGERY
Discharge: HOME | End: 2024-08-16
Attending: INTERNAL MEDICINE | Admitting: INTERNAL MEDICINE
Payer: MEDICARE

## 2024-08-16 VITALS
WEIGHT: 240 LBS | TEMPERATURE: 96.8 F | HEIGHT: 67 IN | DIASTOLIC BLOOD PRESSURE: 75 MMHG | SYSTOLIC BLOOD PRESSURE: 125 MMHG | OXYGEN SATURATION: 94 % | RESPIRATION RATE: 12 BRPM | BODY MASS INDEX: 37.67 KG/M2 | HEART RATE: 65 BPM

## 2024-08-16 DIAGNOSIS — I48.0 PAROXYSMAL A-FIB (MULTI): ICD-10-CM

## 2024-08-16 LAB
ACT BLD: 175 SEC (ref 82–174)
ACT BLD: 356 SEC (ref 82–174)
ACT BLD: 368 SEC (ref 82–174)

## 2024-08-16 PROCEDURE — 93656 COMPRE EP EVAL ABLTJ ATR FIB: CPT | Performed by: INTERNAL MEDICINE

## 2024-08-16 PROCEDURE — 2500000004 HC RX 250 GENERAL PHARMACY W/ HCPCS (ALT 636 FOR OP/ED): Performed by: NURSE ANESTHETIST, CERTIFIED REGISTERED

## 2024-08-16 PROCEDURE — 2780000003 HC OR 278 NO HCPCS: Performed by: INTERNAL MEDICINE

## 2024-08-16 PROCEDURE — 2500000005 HC RX 250 GENERAL PHARMACY W/O HCPCS: Performed by: NURSE ANESTHETIST, CERTIFIED REGISTERED

## 2024-08-16 PROCEDURE — 76937 US GUIDE VASCULAR ACCESS: CPT | Performed by: INTERNAL MEDICINE

## 2024-08-16 PROCEDURE — C1732 CATH, EP, DIAG/ABL, 3D/VECT: HCPCS | Performed by: INTERNAL MEDICINE

## 2024-08-16 PROCEDURE — 85347 COAGULATION TIME ACTIVATED: CPT

## 2024-08-16 PROCEDURE — 85347 COAGULATION TIME ACTIVATED: CPT | Performed by: INTERNAL MEDICINE

## 2024-08-16 PROCEDURE — 7100000010 HC PHASE TWO TIME - EACH INCREMENTAL 1 MINUTE: Performed by: INTERNAL MEDICINE

## 2024-08-16 PROCEDURE — 2720000007 HC OR 272 NO HCPCS: Performed by: INTERNAL MEDICINE

## 2024-08-16 PROCEDURE — 3700000002 HC GENERAL ANESTHESIA TIME - EACH INCREMENTAL 1 MINUTE: Performed by: INTERNAL MEDICINE

## 2024-08-16 PROCEDURE — C1760 CLOSURE DEV, VASC: HCPCS | Performed by: INTERNAL MEDICINE

## 2024-08-16 PROCEDURE — 3700000001 HC GENERAL ANESTHESIA TIME - INITIAL BASE CHARGE: Performed by: INTERNAL MEDICINE

## 2024-08-16 PROCEDURE — C1766 INTRO/SHEATH,STRBLE,NON-PEEL: HCPCS | Performed by: INTERNAL MEDICINE

## 2024-08-16 PROCEDURE — 93662 INTRACARDIAC ECG (ICE): CPT | Performed by: INTERNAL MEDICINE

## 2024-08-16 PROCEDURE — C1759 CATH, INTRA ECHOCARDIOGRAPHY: HCPCS | Performed by: INTERNAL MEDICINE

## 2024-08-16 PROCEDURE — G0269 OCCLUSIVE DEVICE IN VEIN ART: HCPCS | Mod: 59 | Performed by: INTERNAL MEDICINE

## 2024-08-16 PROCEDURE — C1730 CATH, EP, 19 OR FEW ELECT: HCPCS | Performed by: INTERNAL MEDICINE

## 2024-08-16 PROCEDURE — 7100000009 HC PHASE TWO TIME - INITIAL BASE CHARGE: Performed by: INTERNAL MEDICINE

## 2024-08-16 PROCEDURE — 2500000004 HC RX 250 GENERAL PHARMACY W/ HCPCS (ALT 636 FOR OP/ED): Performed by: INTERNAL MEDICINE

## 2024-08-16 RX ORDER — PROPOFOL 10 MG/ML
INJECTION, EMULSION INTRAVENOUS AS NEEDED
Status: DISCONTINUED | OUTPATIENT
Start: 2024-08-16 | End: 2024-08-16

## 2024-08-16 RX ORDER — MIDAZOLAM HYDROCHLORIDE 1 MG/ML
INJECTION INTRAMUSCULAR; INTRAVENOUS AS NEEDED
Status: DISCONTINUED | OUTPATIENT
Start: 2024-08-16 | End: 2024-08-16

## 2024-08-16 RX ORDER — ONDANSETRON HYDROCHLORIDE 2 MG/ML
INJECTION, SOLUTION INTRAVENOUS AS NEEDED
Status: DISCONTINUED | OUTPATIENT
Start: 2024-08-16 | End: 2024-08-16

## 2024-08-16 RX ORDER — PROTAMINE SULFATE 10 MG/ML
INJECTION, SOLUTION INTRAVENOUS AS NEEDED
Status: DISCONTINUED | OUTPATIENT
Start: 2024-08-16 | End: 2024-08-16

## 2024-08-16 RX ORDER — LIDOCAINE HYDROCHLORIDE 10 MG/ML
INJECTION INFILTRATION; PERINEURAL AS NEEDED
Status: DISCONTINUED | OUTPATIENT
Start: 2024-08-16 | End: 2024-08-16

## 2024-08-16 RX ORDER — PHENYLEPHRINE 10 MG/250 ML(40 MCG/ML)IN 0.9 % SOD.CHLORIDE INTRAVENOUS
CONTINUOUS PRN
Status: DISCONTINUED | OUTPATIENT
Start: 2024-08-16 | End: 2024-08-16

## 2024-08-16 RX ORDER — ROCURONIUM BROMIDE 10 MG/ML
INJECTION, SOLUTION INTRAVENOUS AS NEEDED
Status: DISCONTINUED | OUTPATIENT
Start: 2024-08-16 | End: 2024-08-16

## 2024-08-16 RX ORDER — PHENYLEPHRINE HCL IN 0.9% NACL 0.4MG/10ML
SYRINGE (ML) INTRAVENOUS AS NEEDED
Status: DISCONTINUED | OUTPATIENT
Start: 2024-08-16 | End: 2024-08-16

## 2024-08-16 RX ORDER — FENTANYL CITRATE 50 UG/ML
INJECTION, SOLUTION INTRAMUSCULAR; INTRAVENOUS AS NEEDED
Status: DISCONTINUED | OUTPATIENT
Start: 2024-08-16 | End: 2024-08-16

## 2024-08-16 RX ORDER — HEPARIN SODIUM 1000 [USP'U]/ML
INJECTION, SOLUTION INTRAVENOUS; SUBCUTANEOUS AS NEEDED
Status: DISCONTINUED | OUTPATIENT
Start: 2024-08-16 | End: 2024-08-16 | Stop reason: HOSPADM

## 2024-08-16 RX ORDER — HEPARIN SODIUM 10000 [USP'U]/100ML
INJECTION, SOLUTION INTRAVENOUS CONTINUOUS PRN
Status: DISCONTINUED | OUTPATIENT
Start: 2024-08-16 | End: 2024-08-16 | Stop reason: HOSPADM

## 2024-08-16 ASSESSMENT — COLUMBIA-SUICIDE SEVERITY RATING SCALE - C-SSRS
2. HAVE YOU ACTUALLY HAD ANY THOUGHTS OF KILLING YOURSELF?: NO
1. IN THE PAST MONTH, HAVE YOU WISHED YOU WERE DEAD OR WISHED YOU COULD GO TO SLEEP AND NOT WAKE UP?: NO
6. HAVE YOU EVER DONE ANYTHING, STARTED TO DO ANYTHING, OR PREPARED TO DO ANYTHING TO END YOUR LIFE?: NO

## 2024-08-16 ASSESSMENT — PAIN SCALES - GENERAL: PAIN_LEVEL: 2

## 2024-08-16 NOTE — ANESTHESIA POSTPROCEDURE EVALUATION
Patient: Lizzie Cadet    Procedure Summary       Date: 08/16/24 Room / Location: [facility] / [id number] Cardiac Cath Lab    Anesthesia Start: 0739 Anesthesia Stop: 1100    Procedure: Ablation A-Fib Paroxysmal Diagnosis:       Paroxysmal A-fib (Multi)      (Paroxysmal A-fib (Multi) [I48.0])    Providers: Bandar Tapia MD Responsible Provider: Chantel Armenta MD    Anesthesia Type: general ASA Status: 3            Anesthesia Type: general    Vitals Value Taken Time   /75 08/16/24 1100   Temp 36 °C (96.8 °F) 08/16/24 1100   Pulse 65 08/16/24 1100   Resp 12 08/16/24 1100   SpO2 98 % 08/16/24 1100       Anesthesia Post Evaluation    Patient location during evaluation: floor  Patient participation: complete - patient participated  Level of consciousness: awake and alert  Pain score: 2  Pain management: adequate  Airway patency: patent  Cardiovascular status: acceptable  Respiratory status: acceptable  Hydration status: acceptable  Postoperative Nausea and Vomiting: none        No notable events documented.

## 2024-08-16 NOTE — DISCHARGE INSTRUCTIONS
INSTRUCTIONS AFTER ABLATION PROCEDURE:    * You will need to continue blood thinner (Eliquis every 12 hours) until instructed otherwise. It is important not to interrupt blood thinner for any reason (other than an emergency) during the first 30 days after ablation.    * You will increase your home Esomeprazole 40 mg TWICE DAILY (a heartburn medicine) for 4 weeks to protect the esophagus as it can become irritated with ablation. It is very important that you take this medication.    * All other medications will generally remain the same unless you are told otherwise.  Resume taking your home medications today (including blood thinner) as listed on the discharge instructions.    * In the first week post-ablation you should take it easy. No heavy lifting or heavy exercise, no treadmill. You can use the stairs if needed but go slowly and minimize the number of times up and down.    * Some minor bruising is common at each groin access site with minor soreness as if you had banged the area. Bruising may occasionally be seen to extend down the leg. This is normal as is an occasional small quarter sized bump in the area. If larger swelling or more significant pain occurs at the area, please contact the office or go the nearest Emergency Room.    * You may have some minor chest pain for the next week or so. The pain will often worsen with a deep breath and be better when leaning forward. This is pericardial chest pain from the ablation and is generally not of concern. It should resolve within a week although it might increase for a day or so after the ablation.    * If you develop unexplained fevers exceeding 100 degrees anytime within the first 3 weeks post-ablation, you need to contact the office. Low grade fevers of around 99 degrees are common in the first day or so post-ablation.    * Atrial fibrillation (AFib) can recur in all patients who undergo this ablation for up to 4-8 weeks post-ablation. The ablation itself can  cause inflammation (pericarditis) in the atria and this can cause AFib. Some patients will actually experience an increased amount of atrial arrhythmia early after ablation. Approximately 1/3 of patients will have this early recurrence of AFib. Medications should be continued and your heart rate controlled. Nothing else needs be done initially except waiting as in many cases these episodes of AFib will prove self limited.    * Continue to follow up with your primary care physician, primary cardiologist, and any other specialists you normally see.    * No driving for 2 days post procedure (IF you were driving prior to procedure)    *Diet: Heart healthy    Call Provider If:  Breathing faster than normal.     Fever of 100.4 F (38 C) or higher.     Chills.     Any new concerning symptoms.     Passing out.     Patient Instructions, Next 24 hours:  DO NOT drive a car, operate machinery or power tools.  It is recommended that a responsible adult be with you for the first 24 hours.     DO NOT drink any alcoholic drinks or take any non-prescriptive medications that contain alcohol for the first 24 hours.     DO NOT make any important decisions for the first 24 hours.    Activity:  You are advised to go directly home from the hospital.     DO NOT lift anything heavier than 10 pounds for one week, this allows for proper healing of the groin.     No excessive exercise or treadmill use for one week. You may walk and do stairs, slowly.     No sexual activities for 24 hours after you arrive home.    Wound Care:  If slight bleeding should occur at groin site, lie down and have someone apply firm pressure just above the puncture site for 5 minutes.  If it continues or is profuse, call 911. Always notify your doctor if bleeding occurs.     Keep site clean and dry. Let air dry or you may use a simple bandaid.     Gently cleanse the puncture site in your groin with soap and water only.     You may experience some tenderness, bruising  or minimal inflammation.  If you have any concerns, you may contact the EP Lab or if any of these symptoms become excessive, contact your electrophysiologist or go to the emergency room.     No tub baths, soaking, hot tubs, or swimming for one week.     May shower the next day after your procedure.    Other Instructions:  If you have any questions about the effects of the sedative drugs or groin care, call the physician who performed your procedure.    FOLLOW UP:  1) Primary care physician 2 weeks--call to schedule    2) Danni Marie CNP ( Electrophysiology) 1 month after ablation   will notify you of appointment. If you haven't heard in 1 week, please call [phone]

## 2024-08-16 NOTE — ANESTHESIA PREPROCEDURE EVALUATION
Patient: Lizzie Cadet    Procedure Information       Date/Time: 08/16/24 0800    Procedure: Ablation A-Fib Paroxysmal - EPS 3D W CARTO GA WHOLE CASE    Location: [facility]ERE[facility] / Virtual [facility] MAT [facility]24 Cardiac Cath Lab    Providers: Bandar Taipa MD            Relevant Problems   Cardiac   (+) AF (paroxysmal atrial fibrillation) (Multi)   (+) Chest pain   (+) Mixed hyperlipidemia   (+) PAC (premature atrial contraction)   (+) Ventricular arrhythmia      Pulmonary   (+) Obstructive sleep apnea syndrome      GI   (+) GERD (gastroesophageal reflux disease)      Musculoskeletal   (+) Degeneration of intervertebral disc of lumbar region   (+) Osteoarthritis       Clinical information reviewed:   Tobacco  Allergies  Meds   Med Hx  Surg Hx   Fam Hx  Soc Hx        NPO Detail:  NPO/Void Status  Date of Last Liquid: 08/16/24  Time of Last Liquid: 0000  Date of Last Solid: 08/16/24  Time of Last Solid: 0000         Physical Exam    Airway  Mallampati: II  TM distance: <3 FB  Neck ROM: limited  Comments: Can bite upper lip; decreased neck motion; hyomental distance about 2 finger breadths   Cardiovascular    Dental    Pulmonary    Abdominal        Anesthesia Plan    History of general anesthesia?: yes  History of complications of general anesthesia?: yes    ASA 3     general     intravenous induction   Anesthetic plan and risks discussed with patient.    Plan discussed with CRNA and attending.

## 2024-08-16 NOTE — ANESTHESIA PROCEDURE NOTES
Airway  Date/Time: 8/16/2024 8:25 AM  Urgency: elective    Airway not difficult    Staffing  Performed: CRNA   Authorized by: Chantel Armenta MD    Performed by: JUANCARLOS Weber-TIM  Patient location during procedure: OR    Indications and Patient Condition  Indications for airway management: anesthesia and airway protection  Spontaneous Ventilation: absent  Sedation level: minimal  Preoxygenated: yes  Patient position: sniffing  Mask difficulty assessment: 1 - vent by mask    Final Airway Details  Final airway type: endotracheal airway      Successful airway: ETT     Blade size: #3  ETT size (mm): 6.5  Placement verified by: chest auscultation and capnometry   Measured from: lips  ETT to lips (cm): 21  Number of attempts at approach: 1

## 2024-08-16 NOTE — PROGRESS NOTES
Pharmacy Medication History Review    Lizzie Cadet is a 74 y.o. female admitted for No Principal Problem: There is no principal problem currently on the Problem List. Please update the Problem List and refresh.. Pharmacy reviewed the patient's [id number] medications and allergies for accuracy.    Medications ADDED:  Acyclovir      The list below reflects the updated PTA list. Comments regarding how patient may be taking medications differently can be found in the Admit Orders Activity  Prior to Admission Medications   Prescriptions Last Dose Informant Patient Reported?   apixaban (Eliquis) 5 mg tablet 8/13/2024 Self No   Sig: Take 1 tablet (5 mg) by mouth 2 times a day.   calcium carb/vit D3/minerals (CALCIUM-VITAMIN D ORAL) 8/14/2024 Self Yes   Sig: Take 1 tablet by mouth 2 times a day.   coenzyme Q-10 100 mg capsule 8/14/2024 Self Yes   Sig: Take 1 capsule (100 mg) by mouth once daily.   esomeprazole (NexIUM) 40 mg DR capsule 8/15/2024 Self Yes   Sig: Take 1 capsule (40 mg) by mouth once daily in the morning. Take before meals.   ezetimibe (Zetia) 10 mg tablet 8/15/2024 Self Yes   Sig: Take 1 tablet (10 mg) by mouth once daily.   fenofibrate (Triglide) 160 mg tablet 8/15/2024 Self Yes   Sig: Take 1 tablet (160 mg) by mouth once daily.   glucosamine sulfate 2KCl 1,000 mg tablet 8/14/2024 Self Yes   Sig: Take 1 tablet by mouth once daily.   ketoconazole (NIZOral) 2 % cream Past Week Self No   Sig: Apply topically 2 times a day. To redness, flaking, irritation of face around nose, eyebrows, ears and skin folds for 2-4 weeks as needed   metoprolol succinate XL (Toprol-XL) 50 mg 24 hr tablet 8/15/2024 Self No   Sig: Take 1 tablet (50 mg) by mouth once daily. Do not crush or chew.   vit C/E/Zn/coppr/lutein/zeaxan (PRESERVISION AREDS-2 ORAL) 8/14/2024 Self Yes   Sig: Take 1 tablet by mouth twice a day.      Facility-Administered Medications: None       The list below reflects the updated allergy list. Please  review each documented allergy for additional clarification and justification.  Allergies  Reviewed by Shayna Frost RN on 8/16/2024        Severity Reactions Comments    Levofloxacin Not Specified Itching, Swelling     Aspirin Low Hives, Rash     Clarithromycin Low Rash     Niacin Low Hives, Rash             Patient declines M2B at discharge. Pharmacy has been updated to [facility] in [place].    Sources used to complete the med history include out patient fill history, OARRS, and patient interview    Reliable historian. Patient interviewed at bedside, familiar with medications  Cardiology note 7/23/2024    Linda PooleD  Transitions of Care Pharmacist  [facility]  Please reach out via Secure Chat for questions, or if no response call [employer] or [facility]

## 2024-08-16 NOTE — H&P
History Of Present Illness  Lizzie Cadet is a 74 y.o. female presenting with PMH of   HL, SALO, Obesity, GERD, chronic back pain  Palpitations/   SVT  Paroxysmal AF - (index dx Apr 2024) noted on Monitor.  Tx: Metoprolol and Eliquis     AF symptoms include palpitations and fluttering feeling in chest.       TESTING  - ECHO/ TTE:  (4/30/21) LVEF 60-65%.  No sig valve disease.    - Monitor: ZIO 14 days (April 2024) showed AF ([place] 6%) longest run lasting 2hrs and 16 min, 4 runs SVT longest lasting 7 beats. HR range 169-39 bpm with Avg HR 69 bpm.  Rare SVEs ([place] <1%) and VEs ([place] 1.5%).   - NM Ex Stress:  (5/11/21) No evidence of ischemia on ECG tracings or imaging.          Past Medical History  Past Medical History:   Diagnosis Date    Encounter for immunization 09/11/2020    Need for vaccination with 13-polyvalent pneumococcal conjugate vaccine    Personal history of other drug therapy 10/04/2018    History of influenza vaccination    Personal history of other specified conditions 05/28/2020    History of abnormal mammogram    Personal history of other specified conditions 10/04/2018    History of dizziness    Personal history of pneumonia (recurrent) 01/10/2019    History of pneumonia    Polyp of colon 03/12/2021    Polyp of colon    Right upper quadrant pain 02/08/2019    Right upper quadrant abdominal pain       Surgical History  Past Surgical History:   Procedure Laterality Date    BREAST BIOPSY  10/04/2018    Biopsy Breast Percutaneous Needle Core    BREAST BIOPSY Left     Benign    CHOLECYSTECTOMY  10/04/2018    Cholecystectomy    HYSTERECTOMY  10/04/2018    Hysterectomy    OTHER SURGICAL HISTORY  10/04/2018    Arthrodesis Cervical To C___    OTHER SURGICAL HISTORY  10/04/2018    Foot Arthrodesis Intermetatarsal Base 3rd    OTHER SURGICAL HISTORY  10/04/2018    Foot Arthrodesis Intermetatarsal Base 2nd    TONSILLECTOMY  10/04/2018    Tonsillectomy    TOTAL KNEE ARTHROPLASTY  10/04/2018    Total Knee  Replacement Right        Social History  She reports that she has never smoked. She has never used smokeless tobacco. She reports current alcohol use. She reports that she does not use drugs.    Family History  Family History   Problem Relation Name Age of Onset    Breast cancer Mother  60 - 69    Breast cancer Maternal Grandmother  60 - 69    Breast cancer Mother's Sister  40        Allergies  Levofloxacin, Aspirin, Clarithromycin, and Niacin    Review of Systems   All other systems reviewed and are negative.       Physical Exam  Vitals and nursing note reviewed.   Cardiovascular:      Rate and Rhythm: Normal rate and regular rhythm.      Pulses: Normal pulses.      Heart sounds: Normal heart sounds.   Pulmonary:      Effort: Pulmonary effort is normal.      Breath sounds: Normal breath sounds.   Skin:     General: Skin is warm.   Neurological:      General: No focal deficit present.      Mental Status: She is oriented to person, place, and time. Mental status is at baseline.          Last Recorded Vitals  There were no vitals taken for this visit.    Relevant Results         Assessment/Plan   Assessment & Plan      RFA/PVI procedure with general anesthesia       I spent 60 minutes in the professional and overall care of this patient.      Epifanio Stacy MD

## 2024-09-11 ENCOUNTER — OFFICE VISIT (OUTPATIENT)
Dept: CARDIOLOGY | Facility: CLINIC | Age: 74
End: 2024-09-11
Payer: MEDICARE

## 2024-09-11 VITALS
WEIGHT: 242 LBS | HEIGHT: 67 IN | DIASTOLIC BLOOD PRESSURE: 85 MMHG | HEART RATE: 68 BPM | OXYGEN SATURATION: 96 % | BODY MASS INDEX: 37.98 KG/M2 | SYSTOLIC BLOOD PRESSURE: 137 MMHG | RESPIRATION RATE: 16 BRPM

## 2024-09-11 DIAGNOSIS — I48.0 AF (PAROXYSMAL ATRIAL FIBRILLATION) (MULTI): ICD-10-CM

## 2024-09-11 PROCEDURE — 1159F MED LIST DOCD IN RCRD: CPT | Performed by: NURSE PRACTITIONER

## 2024-09-11 PROCEDURE — 93010 ELECTROCARDIOGRAM REPORT: CPT | Performed by: INTERNAL MEDICINE

## 2024-09-11 PROCEDURE — 99214 OFFICE O/P EST MOD 30 MIN: CPT | Performed by: NURSE PRACTITIONER

## 2024-09-11 PROCEDURE — 93005 ELECTROCARDIOGRAM TRACING: CPT | Performed by: NURSE PRACTITIONER

## 2024-09-11 PROCEDURE — 1160F RVW MEDS BY RX/DR IN RCRD: CPT | Performed by: NURSE PRACTITIONER

## 2024-09-11 PROCEDURE — 3008F BODY MASS INDEX DOCD: CPT | Performed by: NURSE PRACTITIONER

## 2024-09-11 PROCEDURE — 1036F TOBACCO NON-USER: CPT | Performed by: NURSE PRACTITIONER

## 2024-09-11 ASSESSMENT — PATIENT HEALTH QUESTIONNAIRE - PHQ9
2. FEELING DOWN, DEPRESSED OR HOPELESS: NOT AT ALL
SUM OF ALL RESPONSES TO PHQ9 QUESTIONS 1 AND 2: 0
1. LITTLE INTEREST OR PLEASURE IN DOING THINGS: NOT AT ALL

## 2024-09-11 ASSESSMENT — ENCOUNTER SYMPTOMS
HEADACHES: 0
HEMOPTYSIS: 0
SPUTUM PRODUCTION: 0
PND: 0
SHORTNESS OF BREATH: 0
SYNCOPE: 0
SORE THROAT: 0
FALLS: 0
IRREGULAR HEARTBEAT: 1
MYALGIAS: 0
NEAR-SYNCOPE: 0
PALPITATIONS: 1
DIAPHORESIS: 0
SNORING: 0
DIZZINESS: 0
DIARRHEA: 0
ORTHOPNEA: 0
DYSPNEA ON EXERTION: 0
LIGHT-HEADEDNESS: 0
ABDOMINAL PAIN: 0
NAUSEA: 0
VOMITING: 0
WEAKNESS: 0
FEVER: 0
COUGH: 0
BLURRED VISION: 0
DOUBLE VISION: 0

## 2024-09-11 NOTE — PROGRESS NOTES
"Subjective   Lizzie Cadet is a 74 y.o. female.    Chief Complaint:  Follow-up (Ablation fuv)    Lizzie Cadet is a 74 y.o. with:      1.HL, SALO, Obesity, GERD, chronic back pain  2. Palpitations/   3. SVT  4. Paroxysmal AF - (index dx Apr 2024) noted on Monitor.  Tx: Metoprolol and Eliquis     AF symptoms include palpitations and fluttering feeling in chest.       TESTING  -ECHO/ TTE:  (4/30/21) LVEF 60-65%.  No sig valve disease.     -Monitor: ZIO 14 days (April 2024) showed AF ([place] 6%) longest run lasting 2hrs and 16 min, 4 runs SVT longest lasting 7 beats. HR range 169-39 bpm with Avg HR 69 bpm.  Rare SVEs ([place] <1%) and VEs ([place] 1.5%).    -NM Ex Stress:  (5/11/21) No evidence of ischemia on ECG tracings or imaging.       Now s/p Afib RFA with Dr. Tapia 8/16/2024.  Patient was noted to have scarring in the anterior and roof wall of the atrium.  Atrial flutter was not induced during the procedure, so the scar was not ablated  ECG 9/11/2024 NSR HR 65 bpm, left axis deviation    TODAY Patient presents for 1 month follow up post ablation.  She is still feeling fatigued.  She continues to have fluttering's in her chest, however they have become less frequent and shorter.  They are happening every day the first week after the ablation, now its about every other day lasting about 5 minutes.  She had some questions regarding her procedure what was found.    /85 (BP Location: Left arm)   Pulse 68   Resp 16   Ht 1.702 m (5' 7\")   Wt 110 kg (242 lb)   SpO2 96%   BMI 37.90 kg/m²   Current Outpatient Medications on File Prior to Visit   Medication Sig Dispense Refill    apixaban (Eliquis) 5 mg tablet Take 1 tablet (5 mg) by mouth 2 times a day. 60 tablet 11    calcium carb/vit D3/minerals (CALCIUM-VITAMIN D ORAL) Take 1 tablet by mouth 2 times a day.      coenzyme Q-10 100 mg capsule Take 1 capsule (100 mg) by mouth once daily.      esomeprazole (NexIUM) 40 mg DR capsule Take 4 capsules (160 mg) by " mouth once daily in the morning. Take before meals.      ezetimibe (Zetia) 10 mg tablet Take 1 tablet (10 mg) by mouth once daily.      fenofibrate (Triglide) 160 mg tablet Take 1 tablet (160 mg) by mouth once daily.      glucosamine sulfate 2KCl 1,000 mg tablet Take 1 tablet by mouth once daily.      ketoconazole (NIZOral) 2 % cream Apply topically 2 times a day. To redness, flaking, irritation of face around nose, eyebrows, ears and skin folds for 2-4 weeks as needed 60 g 11    metoprolol succinate XL (Toprol-XL) 50 mg 24 hr tablet Take 1 tablet (50 mg) by mouth once daily. Do not crush or chew. 30 tablet 11    vit C/E/Zn/coppr/lutein/zeaxan (PRESERVISION AREDS-2 ORAL) Take 1 tablet by mouth twice a day.       No current facility-administered medications on file prior to visit.         Review of Systems   Constitutional: Positive for malaise/fatigue. Negative for diaphoresis and fever.   HENT:  Negative for congestion and sore throat.    Eyes:  Negative for blurred vision and double vision.   Cardiovascular:  Positive for irregular heartbeat and palpitations. Negative for chest pain, dyspnea on exertion, leg swelling, near-syncope, orthopnea, paroxysmal nocturnal dyspnea and syncope.   Respiratory:  Negative for cough, hemoptysis, shortness of breath, snoring and sputum production.    Hematologic/Lymphatic: Negative for bleeding problem.   Skin:  Negative for rash.   Musculoskeletal:  Negative for falls, joint pain and myalgias.   Gastrointestinal:  Negative for abdominal pain, diarrhea, nausea and vomiting.   Neurological:  Negative for dizziness, headaches, light-headedness and weakness.   All other systems reviewed and are negative.      Objective   Constitutional:       Appearance: Healthy appearance. Not in distress.   Eyes:      Conjunctiva/sclera: Conjunctivae normal.      Pupils: Pupils are equal, round, and reactive to light.   HENT:    Mouth/Throat:      Pharynx: Oropharynx is clear.   Pulmonary:       Effort: Pulmonary effort is normal.      Breath sounds: Normal breath sounds. No wheezing. No rhonchi. No rales.   Cardiovascular:      PMI at left midclavicular line. Normal rate. Regular rhythm.      Murmurs: There is no murmur.      No gallop.    Pulses:     Intact distal pulses.   Edema:     Peripheral edema absent.   Abdominal:      General: Bowel sounds are normal.      Palpations: Abdomen is soft.      Tenderness: There is no abdominal tenderness.   Musculoskeletal: Normal range of motion.         General: No tenderness. Skin:     General: Skin is warm and dry.      Comments: Right groin puncture site intact.   Neurological:      General: No focal deficit present.      Mental Status: Alert and oriented to person, place and time.         Lab Review:   Lab Results   Component Value Date     08/02/2024    K 4.8 08/02/2024     08/02/2024    CO2 28 08/02/2024    BUN 18 08/02/2024    CREATININE 0.83 08/02/2024    GLUCOSE 110 (H) 08/02/2024    CALCIUM 11.1 (H) 08/02/2024     Lab Results   Component Value Date    WBC 6.1 08/02/2024    HGB 14.5 08/02/2024    HCT 46.2 (H) 08/02/2024    MCV 97 08/02/2024     08/02/2024       Assessment/Plan   The encounter diagnosis was AF (paroxysmal atrial fibrillation) (Multi).  Patient is now 1 month post ablation.  She does not notice a lot of difference in her fatigue.  However she still is having chest fluttering's, now down to every other day for a few minutes.  We discussed that this can be normal so soon after the ablation and it is encouraging that they are getting better and not as frequent.  We reviewed her procedure report and discussed the scar tissue from in her atria.  This would suggest that perhaps she had atrial fibrillation longer than she realized and the electrical pathways spread throughout the area.  Scar tissue can generate arrhythmias as well, we would have a low threshold for starting antiarrhythmic medication should her arrhythmia recur  within the first year.  We discussed normal symptoms that can happen post ablation and when she should go to the ED or have an office visit.  Patient expresses understanding, all questions asked and answered.    Continue current medication  Follow-up with me in 2 to 3 months or sooner as needed  Patient educated about the Kardia device and how to forward ECG strips if she is concerned about any readings.

## 2024-09-12 LAB
ATRIAL RATE: 65 BPM
P AXIS: 22 DEGREES
P OFFSET: 200 MS
P ONSET: 126 MS
PR INTERVAL: 176 MS
Q ONSET: 214 MS
QRS COUNT: 11 BEATS
QRS DURATION: 78 MS
QT INTERVAL: 374 MS
QTC CALCULATION(BAZETT): 388 MS
QTC FREDERICIA: 383 MS
R AXIS: -30 DEGREES
T AXIS: 45 DEGREES
T OFFSET: 401 MS
VENTRICULAR RATE: 65 BPM

## 2024-11-13 ENCOUNTER — OFFICE VISIT (OUTPATIENT)
Dept: CARDIOLOGY | Facility: CLINIC | Age: 74
End: 2024-11-13
Payer: MEDICARE

## 2024-11-13 VITALS
OXYGEN SATURATION: 96 % | SYSTOLIC BLOOD PRESSURE: 149 MMHG | RESPIRATION RATE: 16 BRPM | HEART RATE: 65 BPM | BODY MASS INDEX: 38.14 KG/M2 | DIASTOLIC BLOOD PRESSURE: 84 MMHG | HEIGHT: 67 IN | WEIGHT: 243 LBS

## 2024-11-13 DIAGNOSIS — I48.0 AF (PAROXYSMAL ATRIAL FIBRILLATION) (MULTI): ICD-10-CM

## 2024-11-13 LAB
ATRIAL RATE: 65 BPM
P AXIS: 54 DEGREES
P OFFSET: 197 MS
P ONSET: 132 MS
PR INTERVAL: 168 MS
Q ONSET: 216 MS
QRS COUNT: 11 BEATS
QRS DURATION: 76 MS
QT INTERVAL: 358 MS
QTC CALCULATION(BAZETT): 372 MS
QTC FREDERICIA: 367 MS
R AXIS: -28 DEGREES
T AXIS: 30 DEGREES
T OFFSET: 395 MS
VENTRICULAR RATE: 65 BPM

## 2024-11-13 PROCEDURE — 3008F BODY MASS INDEX DOCD: CPT | Performed by: NURSE PRACTITIONER

## 2024-11-13 PROCEDURE — 1160F RVW MEDS BY RX/DR IN RCRD: CPT | Performed by: NURSE PRACTITIONER

## 2024-11-13 PROCEDURE — 1036F TOBACCO NON-USER: CPT | Performed by: NURSE PRACTITIONER

## 2024-11-13 PROCEDURE — 1159F MED LIST DOCD IN RCRD: CPT | Performed by: NURSE PRACTITIONER

## 2024-11-13 PROCEDURE — 99214 OFFICE O/P EST MOD 30 MIN: CPT | Performed by: NURSE PRACTITIONER

## 2024-11-13 PROCEDURE — 93005 ELECTROCARDIOGRAM TRACING: CPT | Performed by: NURSE PRACTITIONER

## 2024-11-13 ASSESSMENT — ENCOUNTER SYMPTOMS
SHORTNESS OF BREATH: 0
COUGH: 0
IRREGULAR HEARTBEAT: 0
ABDOMINAL PAIN: 0
PND: 0
SORE THROAT: 0
MYALGIAS: 0
BLURRED VISION: 0
HEMOPTYSIS: 0
DYSPNEA ON EXERTION: 0
DIARRHEA: 0
DIAPHORESIS: 0
SPUTUM PRODUCTION: 0
HEADACHES: 0
NAUSEA: 0
SYNCOPE: 0
ORTHOPNEA: 0
WEAKNESS: 0
NEAR-SYNCOPE: 0
VOMITING: 0
DIZZINESS: 0
FEVER: 0
LIGHT-HEADEDNESS: 0
SNORING: 0
FALLS: 0
PALPITATIONS: 1
DOUBLE VISION: 0

## 2024-11-13 NOTE — PROGRESS NOTES
"Subjective   Lizzie Cadet is a 74 y.o. female.    Chief Complaint:  Follow-up    Lizzie Cadet is a 74 y.o. with:      1.HL, SALO, Obesity, GERD, chronic back pain  2. Palpitations/   3. SVT  4. Paroxysmal AF - (index dx Apr 2024) noted on Monitor.  Tx: Metoprolol and Eliquis     AF symptoms include palpitations and fluttering feeling in chest.       TESTING  -ECHO/ TTE:  (4/30/21) LVEF 60-65%.  No sig valve disease.     -Monitor: ZIO 14 days (April 2024) showed AF ([place] 6%) longest run lasting 2hrs and 16 min, 4 runs SVT longest lasting 7 beats. HR range 169-39 bpm with Avg HR 69 bpm.  Rare SVEs ([place] <1%) and VEs ([place] 1.5%).    -NM Ex Stress:  (5/11/21) No evidence of ischemia on ECG tracings or imaging.       Now s/p Afib RFA with Dr. Tapia 8/16/2024.  Patient was noted to have scarring in the anterior and roof wall of the atrium.  Atrial flutter was not induced during the procedure, so the scar was not ablated  ECG 9/11/2024 NSR HR 65 bpm, left axis deviation  ECG 11/13/2024 NSR HR 65 bpm    TODAY patient presents for 3-month follow-up post A-fib ablation.  Overall, she is feeling better.  She still gets the occasional palpitations at night that last a few minutes.  They only happen maybe once every 1 to 2 weeks.  She does not feel like this is similar to her A-fib symptoms.  She denies any chest pain, shortness of breath, dizziness and syncope.  She denies any issues on the blood thinner.  Her only complaint today is that she belches frequently when eating.    /84 (BP Location: Left arm)   Pulse 65   Resp 16   Ht 1.702 m (5' 7\")   Wt 110 kg (243 lb)   SpO2 96%   BMI 38.06 kg/m²   Current Outpatient Medications on File Prior to Visit   Medication Sig Dispense Refill    apixaban (Eliquis) 5 mg tablet Take 1 tablet (5 mg) by mouth 2 times a day. 60 tablet 11    calcium carb/vit D3/minerals (CALCIUM-VITAMIN D ORAL) Take 1 tablet by mouth 2 times a day.      coenzyme Q-10 100 mg capsule Take 1 " capsule (100 mg) by mouth once daily.      esomeprazole (NexIUM) 40 mg DR capsule Take 4 capsules (160 mg) by mouth once daily in the morning. Take before meals.      ezetimibe (Zetia) 10 mg tablet Take 1 tablet (10 mg) by mouth once daily.      fenofibrate (Triglide) 160 mg tablet Take 1 tablet (160 mg) by mouth once daily.      glucosamine sulfate 2KCl 1,000 mg tablet Take 1 tablet by mouth once daily.      ketoconazole (NIZOral) 2 % cream Apply topically 2 times a day. To redness, flaking, irritation of face around nose, eyebrows, ears and skin folds for 2-4 weeks as needed 60 g 11    metoprolol succinate XL (Toprol-XL) 50 mg 24 hr tablet Take 1 tablet (50 mg) by mouth once daily. Do not crush or chew. 30 tablet 11    vit C/E/Zn/coppr/lutein/zeaxan (PRESERVISION AREDS-2 ORAL) Take 1 tablet by mouth twice a day.       No current facility-administered medications on file prior to visit.         Review of Systems   Constitutional: Negative for diaphoresis, fever and malaise/fatigue.   HENT:  Negative for congestion and sore throat.    Eyes:  Negative for blurred vision and double vision.   Cardiovascular:  Positive for palpitations. Negative for chest pain, dyspnea on exertion, irregular heartbeat, leg swelling, near-syncope, orthopnea, paroxysmal nocturnal dyspnea and syncope.   Respiratory:  Negative for cough, hemoptysis, shortness of breath, snoring and sputum production.    Hematologic/Lymphatic: Negative for bleeding problem.   Skin:  Negative for rash.   Musculoskeletal:  Negative for falls, joint pain and myalgias.   Gastrointestinal:  Negative for abdominal pain, diarrhea, nausea and vomiting.   Neurological:  Negative for dizziness, headaches, light-headedness and weakness.   All other systems reviewed and are negative.      Objective   Constitutional:       Appearance: Healthy appearance. Not in distress.   Eyes:      Conjunctiva/sclera: Conjunctivae normal.      Pupils: Pupils are equal, round, and  reactive to light.   HENT:    Mouth/Throat:      Pharynx: Oropharynx is clear.   Pulmonary:      Effort: Pulmonary effort is normal.      Breath sounds: Normal breath sounds. No wheezing. No rhonchi. No rales.   Cardiovascular:      PMI at left midclavicular line. Normal rate. Regular rhythm.      Murmurs: There is no murmur.      No gallop.    Pulses:     Intact distal pulses.   Edema:     Peripheral edema absent.   Abdominal:      General: Bowel sounds are normal.      Palpations: Abdomen is soft.      Tenderness: There is no abdominal tenderness.   Musculoskeletal: Normal range of motion.         General: No tenderness. Skin:     General: Skin is warm and dry.   Neurological:      General: No focal deficit present.      Mental Status: Alert and oriented to person, place and time.       I reviewed her her most recent echo, CBC, and BMP  Lab Review:   Lab Results   Component Value Date     08/02/2024    K 4.8 08/02/2024     08/02/2024    CO2 28 08/02/2024    BUN 18 08/02/2024    CREATININE 0.83 08/02/2024    GLUCOSE 110 (H) 08/02/2024    CALCIUM 11.1 (H) 08/02/2024     Lab Results   Component Value Date    WBC 6.1 08/02/2024    HGB 14.5 08/02/2024    HCT 46.2 (H) 08/02/2024    MCV 97 08/02/2024     08/02/2024       Assessment/Plan   The encounter diagnosis was AF (paroxysmal atrial fibrillation) (Multi).  Patient is now 3 months post A-fib ablation and appears to be maintaining normal sinus rhythm.  She monitors her rhythm at home with her [employer] device.  The nocturnal palpitations are less frequent and do not last more than a few minutes.  I do not believe her belching is related to her ablation anyway.  She denies any fever, chest pain or shortness of breath.  She does admit to some stress since moving and reduced her Nexium dosage.    We discussed lifestyle modifications that can help maintain normal sinus rhythm such as exercise, weight loss, managing hypertension, treating sleep apnea, and  minimizing alcohol intake.  All questions asked and answered.    Continue Eliquis for stroke prevention  Continue metoprolol for rate control  Follow-up with general cardiology and me in 6 months or sooner as needed.  Patient can reach out if she feels like her palpitations are getting worse we can get her set up with a monitor with a sensitive skin adhesive patch

## 2025-05-13 ENCOUNTER — OFFICE VISIT (OUTPATIENT)
Dept: CARDIOLOGY | Facility: CLINIC | Age: 75
End: 2025-05-13
Payer: MEDICARE

## 2025-05-13 VITALS
HEART RATE: 62 BPM | DIASTOLIC BLOOD PRESSURE: 68 MMHG | BODY MASS INDEX: 37.75 KG/M2 | SYSTOLIC BLOOD PRESSURE: 110 MMHG | WEIGHT: 241 LBS | OXYGEN SATURATION: 94 %

## 2025-05-13 DIAGNOSIS — I48.0 PAROXYSMAL ATRIAL FIBRILLATION (MULTI): ICD-10-CM

## 2025-05-13 DIAGNOSIS — E78.5 HYPERLIPIDEMIA, UNSPECIFIED HYPERLIPIDEMIA TYPE: ICD-10-CM

## 2025-05-13 DIAGNOSIS — R00.2 PALPITATIONS: Primary | ICD-10-CM

## 2025-05-13 PROCEDURE — 1036F TOBACCO NON-USER: CPT | Performed by: NURSE PRACTITIONER

## 2025-05-13 PROCEDURE — G2211 COMPLEX E/M VISIT ADD ON: HCPCS | Performed by: NURSE PRACTITIONER

## 2025-05-13 PROCEDURE — 1159F MED LIST DOCD IN RCRD: CPT | Performed by: NURSE PRACTITIONER

## 2025-05-13 PROCEDURE — 99214 OFFICE O/P EST MOD 30 MIN: CPT | Performed by: NURSE PRACTITIONER

## 2025-05-13 RX ORDER — METOPROLOL SUCCINATE 50 MG/1
50 TABLET, EXTENDED RELEASE ORAL DAILY
Qty: 90 TABLET | Refills: 3 | Status: SHIPPED | OUTPATIENT
Start: 2025-05-13 | End: 2026-05-13

## 2025-05-13 ASSESSMENT — ENCOUNTER SYMPTOMS
NEUROLOGICAL NEGATIVE: 1
ENDOCRINE NEGATIVE: 1
MYALGIAS: 1
GASTROINTESTINAL NEGATIVE: 1
PSYCHIATRIC NEGATIVE: 1
PALPITATIONS: 1
HEMATOLOGIC/LYMPHATIC NEGATIVE: 1
EYES NEGATIVE: 1
RESPIRATORY NEGATIVE: 1

## 2025-05-13 NOTE — PROGRESS NOTES
Referred by Dr. Sarabia for No chief complaint on file.     History Of Present Illness:    Lizzie Cadet is a very pleasant 75 year old female with a history of PAF s/p ablation 8/16/2024 and HLD, she is here for a follow up visit. The patient is seen in collaboration with Dr. Hollis. Occasional episodes of heart palpitations. Complains of general fatigue. Has shortness of breath on exertion. Has increased stress at home.       Review of Systems   Constitutional: Positive for malaise/fatigue.   HENT: Negative.     Eyes: Negative.    Cardiovascular:  Positive for palpitations.   Respiratory: Negative.     Endocrine: Negative.    Hematologic/Lymphatic: Negative.    Skin: Negative.    Musculoskeletal:  Positive for myalgias.   Gastrointestinal: Negative.    Neurological: Negative.    Psychiatric/Behavioral: Negative.        Past Medical History:  She has a past medical history of Encounter for immunization (09/11/2020), Personal history of other drug therapy (10/04/2018), Personal history of other specified conditions (05/28/2020), Personal history of other specified conditions (10/04/2018), Personal history of pneumonia (recurrent) (01/10/2019), Polyp of colon (03/12/2021), and Right upper quadrant pain (02/08/2019).    Past Surgical History:  She has a past surgical history that includes Tonsillectomy (10/04/2018); Cholecystectomy (10/04/2018); Breast biopsy (10/04/2018); Hysterectomy (10/04/2018); Total knee arthroplasty (10/04/2018); Other surgical history (10/04/2018); Other surgical history (10/04/2018); Other surgical history (10/04/2018); Breast biopsy (Left); and Cardiac electrophysiology procedure (N/A, 8/16/2024).      Social History:  She reports that she has never smoked. She has never used smokeless tobacco. She reports current alcohol use. She reports that she does not use drugs.    Family History:  Family History   Problem Relation Name Age of Onset    Breast cancer Mother  60 - 69    Breast cancer  Maternal Grandmother  60 - 69    Breast cancer Mother's Sister  40        Allergies:  Levofloxacin, Aspirin, Clarithromycin, and Niacin    Outpatient Medications:  Current Outpatient Medications   Medication Instructions    apixaban (ELIQUIS) 5 mg, oral, 2 times daily    calcium carb/vit D3/minerals (CALCIUM-VITAMIN D ORAL) 1 tablet, 2 times daily    coenzyme Q-10 100 mg capsule 1 capsule, Daily    esomeprazole (NEXIUM) 160 mg, Daily before breakfast    ezetimibe (ZETIA) 10 mg, Daily    fenofibrate (TRIGLIDE) 160 mg, Daily    glucosamine sulfate 2KCl 1,000 mg tablet 1 tablet, Daily    ketoconazole (NIZOral) 2 % cream Topical, 2 times daily, To redness, flaking, irritation of face around nose, eyebrows, ears and skin folds for 2-4 weeks as needed    metoprolol succinate XL (TOPROL-XL) 50 mg, oral, Daily, Do not crush or chew.    vit C/E/Zn/coppr/lutein/zeaxan (PRESERVISION AREDS-2 ORAL) 1 tablet, 2 times daily        Last Recorded Vitals:  There were no vitals filed for this visit.      Physical Exam:  Physical Exam  Vitals reviewed.   HENT:      Head: Normocephalic.      Nose: Nose normal.   Eyes:      Pupils: Pupils are equal, round, and reactive to light.   Cardiovascular:      Rate and Rhythm: Normal rate and regular rhythm.   Pulmonary:      Effort: Pulmonary effort is normal.      Breath sounds: Normal breath sounds.   Abdominal:      General: Abdomen is flat.      Palpations: Abdomen is soft.   Musculoskeletal:         General: Normal range of motion.      Cervical back: Normal range of motion.   Skin:     General: Skin is warm and dry.   Neurological:      General: No focal deficit present.      Mental Status: He is alert and oriented to person, place, and time.   Psychiatric:         Mood and Affect: Mood normal.            Last Labs:  CBC -  Lab Results   Component Value Date    WBC 6.1 08/02/2024    HGB 14.5 08/02/2024    HCT 46.2 (H) 08/02/2024    MCV 97 08/02/2024     08/02/2024       CMP -  Lab  Results   Component Value Date    CALCIUM 11.1 (H) 08/02/2024    PROT 6.2 (L) 03/08/2021    ALBUMIN 4.0 03/08/2021    AST 35 03/08/2021    ALT 38 03/08/2021    ALKPHOS 48 03/08/2021    BILITOT 0.4 03/08/2021       LIPID PANEL -   Lab Results   Component Value Date    CHOL 217 (H) 09/17/2021    TRIG 256 (H) 09/17/2021    HDL 47.8 09/17/2021    CHHDL 4.5 09/17/2021    LDLF 118 (H) 09/17/2021    VLDL 51 (H) 09/17/2021    NHDL 169 09/17/2021       RENAL FUNCTION PANEL -   Lab Results   Component Value Date    GLUCOSE 110 (H) 08/02/2024     08/02/2024    K 4.8 08/02/2024     08/02/2024    CO2 28 08/02/2024    ANIONGAP 14 08/02/2024    BUN 18 08/02/2024    CREATININE 0.83 08/02/2024    CALCIUM 11.1 (H) 08/02/2024    ALBUMIN 4.0 03/08/2021        Lab Results   Component Value Date    HGBA1C 6.0 (H) 04/19/2024       Last Cardiology Tests:  ECG:    Echo:  Echocardiogram 6/27/2024  1. Left ventricular ejection fraction is normal, by visual estimate at 60-65%.   2. There is normal right ventricular global systolic function.   3. The left atrium is mildly dilated.   4. Trace mitral valve regurgitation.   5. Mild tricuspid regurgitation visualized.   6. Aortic valve sclerosis.    ECHO: 4/30/2021  1. The left ventricular systolic function is normal with a 60-65% estimated  ejection fraction.  2. There is mild mitral and tricuspid regurgitation.  Ejection Fractions:  LVEF 60-65%  Cath:    Stress Test:  Cardiac Stress Test: 5/11/2021  Summary:  1. No electrocardiographic evidence for ischemia at a maximal infusion.  2. Nuclear image results are reported separately.  3. The adequate level of stress was achieved.     Cardiac Imaging:  G MEDICAL ECG Monitor: 3/29/2021 (14 days duration)     Predominant rhythm was sinus rhythm.  Minimum HR: 35 bpm  Maximum HR: 94 bpm  Average HR: 67 bpm  2 pauses lasting 3.69 - 4.30 seconds noted  Supraventricular ectopics noted 43,818 times including 841 couplets and 60 runs lasting 3-14  beats with rates to 157 bpm. and bpm  Ventricular ectopics noted 19,954 times including couplets, bigeminy and trigeminy.       Assessment/Plan   Very pleasant 75 year old female with a history of PAF s/p PVI 8/16/2024, dyslipidemia and GERD, she is scheduled for PVI 8/16/2024, heart palpitations have improved. Continues to stay active. Clinically in sinus rhythm.  She has been tolerating her medication. Heart rate and blood pressure are well controlled today.      Plan:   Call with any questions   Continue ezetimibe 10 mg daily and fenofibrate 160 mg daily  Follow up in one year   Continue  Metoprolol ER 50 mg daily will try to decrease the Metoprolol to 25 mg daily for a week to see if fatigue improves.   Continue Eliquis 5 mg twice a day       Lakesha Marcelino, APRN-CNP

## 2025-05-14 ENCOUNTER — OFFICE VISIT (OUTPATIENT)
Dept: CARDIOLOGY | Facility: CLINIC | Age: 75
End: 2025-05-14
Payer: MEDICARE

## 2025-05-14 VITALS
BODY MASS INDEX: 37.75 KG/M2 | DIASTOLIC BLOOD PRESSURE: 76 MMHG | WEIGHT: 241 LBS | HEART RATE: 64 BPM | SYSTOLIC BLOOD PRESSURE: 121 MMHG | OXYGEN SATURATION: 97 %

## 2025-05-14 DIAGNOSIS — E66.01 OBESITY, MORBID (MULTI): ICD-10-CM

## 2025-05-14 DIAGNOSIS — I48.0 PAROXYSMAL ATRIAL FIBRILLATION (MULTI): ICD-10-CM

## 2025-05-14 DIAGNOSIS — I48.0 AF (PAROXYSMAL ATRIAL FIBRILLATION) (MULTI): Primary | ICD-10-CM

## 2025-05-14 LAB
ATRIAL RATE: 64 BPM
P AXIS: 0 DEGREES
P OFFSET: 185 MS
P ONSET: 136 MS
PR INTERVAL: 164 MS
Q ONSET: 218 MS
QRS COUNT: 11 BEATS
QRS DURATION: 72 MS
QT INTERVAL: 370 MS
QTC CALCULATION(BAZETT): 381 MS
QTC FREDERICIA: 378 MS
R AXIS: -25 DEGREES
T AXIS: 17 DEGREES
T OFFSET: 403 MS
VENTRICULAR RATE: 64 BPM

## 2025-05-14 PROCEDURE — 1159F MED LIST DOCD IN RCRD: CPT | Performed by: NURSE PRACTITIONER

## 2025-05-14 PROCEDURE — 1036F TOBACCO NON-USER: CPT | Performed by: NURSE PRACTITIONER

## 2025-05-14 PROCEDURE — 99214 OFFICE O/P EST MOD 30 MIN: CPT | Performed by: NURSE PRACTITIONER

## 2025-05-14 PROCEDURE — 1160F RVW MEDS BY RX/DR IN RCRD: CPT | Performed by: NURSE PRACTITIONER

## 2025-05-14 PROCEDURE — 93005 ELECTROCARDIOGRAM TRACING: CPT | Performed by: NURSE PRACTITIONER

## 2025-05-20 PROBLEM — E66.01 OBESITY, MORBID (MULTI): Status: ACTIVE | Noted: 2025-05-20

## 2025-05-20 RX ORDER — METOPROLOL SUCCINATE 50 MG/1
25 TABLET, EXTENDED RELEASE ORAL DAILY
Start: 2025-05-20 | End: 2026-05-20

## 2025-05-20 ASSESSMENT — ENCOUNTER SYMPTOMS
DIAPHORESIS: 0
NAUSEA: 0
DIZZINESS: 0
LIGHT-HEADEDNESS: 0
FALLS: 0
HEADACHES: 0
DYSPNEA ON EXERTION: 0
IRREGULAR HEARTBEAT: 0
VOMITING: 0
SNORING: 0
COUGH: 0
NEAR-SYNCOPE: 0
ABDOMINAL PAIN: 0
FEVER: 0
DOUBLE VISION: 0
WEAKNESS: 0
SPUTUM PRODUCTION: 0
MYALGIAS: 0
SYNCOPE: 0
PALPITATIONS: 1
HEMOPTYSIS: 0
DIARRHEA: 0
BLURRED VISION: 0
ORTHOPNEA: 0
SHORTNESS OF BREATH: 0
PND: 0
SORE THROAT: 0

## 2025-05-20 NOTE — PROGRESS NOTES
Subjective   Lizzie Cadet is a 75 y.o. female.    Chief Complaint:  Follow-up    Lizzie Cadet is a 75 y.o. with:      1.HL, SALO, Obesity, GERD, chronic back pain  2. Palpitations/   3. SVT  4. Paroxysmal AF - (index dx Apr 2024) noted on Monitor.  Tx: Metoprolol and Eliquis     AF symptoms include palpitations and fluttering feeling in chest.       TESTING  -ECHO/ TTE:  (4/30/21) LVEF 60-65%.  No sig valve disease.     -Monitor: ZIO 14 days (April 2024) showed AF ([place] 6%) longest run lasting 2hrs and 16 min, 4 runs SVT longest lasting 7 beats. HR range 169-39 bpm with Avg HR 69 bpm.  Rare SVEs ([place] <1%) and VEs ([place] 1.5%).    -NM Ex Stress:  (5/11/21) No evidence of ischemia on ECG tracings or imaging.       Now s/p Afib RFA with Dr. Tapia 8/16/2024.  Patient was noted to have scarring in the anterior and roof wall of the atrium.  Atrial flutter was not induced during the procedure, so the scar was not ablated  ECG 9/11/2024 NSR HR 65 bpm, left axis deviation  ECG 11/13/2024 NSR HR 65 bpm  ECG 5/14/2025 NSR HR 64 bpm    TODAY patient presents today for 6-month follow-up of her A-fib.  She is now 9 months post ablation.  She has been feeling pretty good overall.  Since her last visit she maybe had less than 10 times where she felt her heart was beating funny.  The episode lasted minutes and resolved on its own.  She does have a home monitor device that did not show any A-fib but occasionally would show unclassified.  She does admit to being fatigued and short of breath when going up stairs sometimes.    /76   Pulse 64   Wt 109 kg (241 lb)   SpO2 97%   BMI 37.75 kg/m²   Current Outpatient Medications on File Prior to Visit   Medication Sig Dispense Refill    apixaban (Eliquis) 5 mg tablet Take 1 tablet (5 mg) by mouth 2 times a day. 180 tablet 3    calcium carb/vit D3/minerals (CALCIUM-VITAMIN D ORAL) Take 1 tablet by mouth 2 times a day.      coenzyme Q-10 100 mg capsule Take 1 capsule (100  mg) by mouth once daily.      esomeprazole (NexIUM) 40 mg DR capsule Take 4 capsules (160 mg) by mouth once daily in the morning. Take before meals.      ezetimibe (Zetia) 10 mg tablet Take 1 tablet (10 mg) by mouth once daily.      fenofibrate (Triglide) 160 mg tablet Take 1 tablet (160 mg) by mouth once daily.      glucosamine sulfate 2KCl 1,000 mg tablet Take 1 tablet by mouth once daily.      ketoconazole (NIZOral) 2 % cream Apply topically 2 times a day. To redness, flaking, irritation of face around nose, eyebrows, ears and skin folds for 2-4 weeks as needed 60 g 11    metoprolol succinate XL (Toprol-XL) 50 mg 24 hr tablet Take 1 tablet (50 mg) by mouth once daily. Do not crush or chew. 90 tablet 3    vit C/E/Zn/coppr/lutein/zeaxan (PRESERVISION AREDS-2 ORAL) Take 1 tablet by mouth twice a day.       No current facility-administered medications on file prior to visit.         Review of Systems   Constitutional: Negative for diaphoresis, fever and malaise/fatigue.   HENT:  Negative for congestion and sore throat.    Eyes:  Negative for blurred vision and double vision.   Cardiovascular:  Positive for palpitations. Negative for chest pain, dyspnea on exertion, irregular heartbeat, leg swelling, near-syncope, orthopnea, paroxysmal nocturnal dyspnea and syncope.   Respiratory:  Negative for cough, hemoptysis, shortness of breath, snoring and sputum production.    Hematologic/Lymphatic: Negative for bleeding problem.   Skin:  Negative for rash.   Musculoskeletal:  Negative for falls, joint pain and myalgias.   Gastrointestinal:  Negative for abdominal pain, diarrhea, nausea and vomiting.   Neurological:  Negative for dizziness, headaches, light-headedness and weakness.   All other systems reviewed and are negative.      Objective   Constitutional:       Appearance: Healthy appearance. Not in distress.   Eyes:      Conjunctiva/sclera: Conjunctivae normal.      Pupils: Pupils are equal, round, and reactive to light.    HENT:    Mouth/Throat:      Pharynx: Oropharynx is clear.   Pulmonary:      Effort: Pulmonary effort is normal.      Breath sounds: Normal breath sounds. No wheezing. No rhonchi. No rales.   Cardiovascular:      PMI at left midclavicular line. Normal rate. Regular rhythm.      Murmurs: There is no murmur.      No gallop.    Pulses:     Intact distal pulses.   Edema:     Peripheral edema absent.   Abdominal:      General: Bowel sounds are normal.      Palpations: Abdomen is soft.      Tenderness: There is no abdominal tenderness.   Musculoskeletal: Normal range of motion.         General: No tenderness. Skin:     General: Skin is warm and dry.   Neurological:      General: No focal deficit present.      Mental Status: Alert and oriented to person, place and time.       I reviewed her her most recent echo, CBC, and BMP  Lab Review:   Lab Results   Component Value Date     08/02/2024    K 4.8 08/02/2024     08/02/2024    CO2 28 08/02/2024    BUN 18 08/02/2024    CREATININE 0.83 08/02/2024    GLUCOSE 110 (H) 08/02/2024    CALCIUM 11.1 (H) 08/02/2024     Lab Results   Component Value Date    WBC 6.1 08/02/2024    HGB 14.5 08/02/2024    HCT 46.2 (H) 08/02/2024    MCV 97 08/02/2024     08/02/2024       Assessment/Plan   The encounter diagnosis was AF (paroxysmal atrial fibrillation) (Multi).  Patient is now 9 months post A-fib ablation and appears to be maintaining normal sinus rhythm.  She monitors her rhythm at home with her [employer] device.  She rarely has palpitations that last more than a couple minutes.  She does admit to some fatigue and difficulty with shortness of breath climbing stairs.    We discussed lifestyle modifications that can help maintain normal sinus rhythm such as exercise, weight loss, managing hypertension, treating sleep apnea, and minimizing alcohol intake.  All questions asked and answered.    Continue Eliquis for stroke prevention  She is bradycardic on ECG, she may be having  some blunted heart rate response to activity.  We will try cutting her metoprolol in half and taking it at night to see if this helps her fatigue and shortness of breath.  I will defer to general cardiology if there is any need for ischemic workup.  Patient lives in [place], but comes back periodically to see family.  We can follow-up with her in 1 year or sooner as needed.  Patient does have a local cardiologist as well

## 2026-05-19 ENCOUNTER — APPOINTMENT (OUTPATIENT)
Dept: CARDIOLOGY | Facility: CLINIC | Age: 76
End: 2026-05-19
Payer: MEDICARE

## (undated) DEVICE — CATHETER, DIAGNOSTIC, SOUNDSTAR ECO SMS, 8FR

## (undated) DEVICE — PATCHES, EXTERNAL REFERENCE, CARTO3

## (undated) DEVICE — CABLE, CONNECTOR, 10FT

## (undated) DEVICE — NEEDLE, NRG TRANSSEPTAL, 98CM, CURVE C0

## (undated) DEVICE — INTRODUCER, SHEATH, FAST-CATH, 8FR X 12CM, C-LOCK

## (undated) DEVICE — SHEATH, STEERABLE, SUREFLEX, MEDIUM CURVE

## (undated) DEVICE — CATHETER, THERMOCOOL SMART TOUCH, SF, D-F CURVE

## (undated) DEVICE — CATHETHER, CS, BI-DIRECTIONAL, 10 POLES, D-F TYPE

## (undated) DEVICE — TUBING SET, IRRIGATION, SMARTABLATE

## (undated) DEVICE — CABLE, 34 HYP, 34 LEMO, 10FT, SMART TOUCH (REPROCESS)

## (undated) DEVICE — INTRODUCER, HEMOSTASIS, STR/J .038 IN, 8.5FR 12CM

## (undated) DEVICE — CABLE, CARTO 3 SYSTEM, ECO INTERFACE, 34-PIN, SPLIT HANDLE (REPROCESSED)

## (undated) DEVICE — INTERFACE CABLE, EXISITING DX CATHETERS, BLUE PORT

## (undated) DEVICE — CATHETER, PENTARAY, NAV ECO, 7FR, 2-6-2 SPACING, D CURVE

## (undated) DEVICE — CLOSURE SYSTEM, VASCULAR, MVP 6-12FR, VENOUS